# Patient Record
Sex: FEMALE | Race: WHITE | NOT HISPANIC OR LATINO | Employment: FULL TIME | URBAN - METROPOLITAN AREA
[De-identification: names, ages, dates, MRNs, and addresses within clinical notes are randomized per-mention and may not be internally consistent; named-entity substitution may affect disease eponyms.]

---

## 2017-04-26 ENCOUNTER — OFFICE VISIT (OUTPATIENT)
Dept: FAMILY MEDICINE CLINIC | Facility: CLINIC | Age: 32
End: 2017-04-26

## 2017-04-26 VITALS
HEIGHT: 63 IN | OXYGEN SATURATION: 96 % | BODY MASS INDEX: 40.75 KG/M2 | WEIGHT: 230 LBS | DIASTOLIC BLOOD PRESSURE: 60 MMHG | SYSTOLIC BLOOD PRESSURE: 98 MMHG | TEMPERATURE: 97.9 F | HEART RATE: 80 BPM

## 2017-04-26 DIAGNOSIS — F17.200 SMOKING: ICD-10-CM

## 2017-04-26 DIAGNOSIS — N91.2 AMENORRHEA: ICD-10-CM

## 2017-04-26 DIAGNOSIS — R53.83 OTHER FATIGUE: ICD-10-CM

## 2017-04-26 DIAGNOSIS — Z00.00 WELLNESS EXAMINATION: Primary | ICD-10-CM

## 2017-04-26 DIAGNOSIS — Z00.00 HEALTH CARE MAINTENANCE: ICD-10-CM

## 2017-04-26 DIAGNOSIS — N92.6 IRREGULAR PERIODS: ICD-10-CM

## 2017-04-26 DIAGNOSIS — E28.2 PCOS (POLYCYSTIC OVARIAN SYNDROME): ICD-10-CM

## 2017-04-26 DIAGNOSIS — L68.0 HIRSUTISM: ICD-10-CM

## 2017-04-26 LAB
B-HCG UR QL: NEGATIVE
INTERNAL NEGATIVE CONTROL: NEGATIVE
INTERNAL POSITIVE CONTROL: POSITIVE
Lab: NORMAL

## 2017-04-26 PROCEDURE — 81025 URINE PREGNANCY TEST: CPT | Performed by: FAMILY MEDICINE

## 2017-04-26 PROCEDURE — 99385 PREV VISIT NEW AGE 18-39: CPT | Performed by: FAMILY MEDICINE

## 2017-04-26 RX ORDER — VARENICLINE TARTRATE 1 MG/1
1 TABLET, FILM COATED ORAL 2 TIMES DAILY
Qty: 60 TABLET | Refills: 2 | Status: SHIPPED | OUTPATIENT
Start: 2017-04-26 | End: 2017-08-21

## 2017-04-26 NOTE — PROGRESS NOTES
Subjective   Sis Castro is a 32 y.o. female. Presents today for   Chief Complaint   Patient presents with   • Establish Care     pt is here to establish care. she would like physical today. her job told her she needs one.    New patient here for wellness visit.   History of Present Illness  Patient here for wellness visit.   Does smoke and interested in quitting, would like to try chantix as heard covered.    Preventative:  Hx of abnormal paps, was seeing gynecology at [facility], [place], operated by [facility], but left practice.  Due is for repeat pap.  Would prefer female provider.    Has not had any recent lab work.  Due for wellness labs.    Has ovarian cyst;  Unwanted hair growth;  Not much acne.   Last menstrual cycle 6 to 7 months ago;  Had urine preg test 2-3 weeks ago negative.    , 3 miscariages      Review of Systems   Constitutional: Positive for fatigue.   Respiratory: Negative for shortness of breath.    Cardiovascular: Positive for leg swelling (Feet occly, when on feet all day.  Resolves after off feet.). Negative for chest pain and palpitations.   Gastrointestinal: Negative for abdominal pain, nausea and vomiting.   Genitourinary:        Reports irregular periods, last sometime in fall;  Can skip for long periods at a time.  Can have heavy bleeding when does have.  Has not had prior work-up for PCOS.       The following portions of the patient's history were reviewed and updated as appropriate: allergies, current medications, past family history, past medical history, past social history, past surgical history and problem list.    There is no problem list on file for this patient.      Allergies   Allergen Reactions   • Percocet [Oxycodone-Acetaminophen]        Current Outpatient Prescriptions on File Prior to Visit   Medication Sig Dispense Refill   • aspirin-acetaminophen-caffeine (EXCEDRIN MIGRAINE) 250-250-65 MG per tablet Take 1 tablet by mouth every 6 (six) hours as needed for headaches.     • [DISCONTINUED]  "acetaminophen (TYLENOL) 500 MG tablet Take 500 mg by mouth every 6 (six) hours as needed for mild pain (1-3).     • [DISCONTINUED] cyclobenzaprine (FLEXERIL) 10 MG tablet Take 1 tablet by mouth 3 (three) times a day as needed for muscle spasms. 15 tablet 0   • [DISCONTINUED] diclofenac (VOLTAREN) 75 MG EC tablet Take 1 tablet by mouth 2 (two) times a day. 20 tablet 0   • [DISCONTINUED] naproxen (NAPROSYN) 500 MG tablet Take 1 tablet by mouth 2 (two) times a day as needed for mild pain (1-3). 14 tablet 0     No current facility-administered medications on file prior to visit.        Objective   Vitals:    04/26/17 0954   BP: 98/60   BP Location: Left arm   Patient Position: Sitting   Cuff Size: Large Adult   Pulse: 80   Temp: 97.9 °F (36.6 °C)   TempSrc: Oral   SpO2: 96%   Weight: 230 lb (104 kg)   Height: 63\" (160 cm)       Physical Exam   Constitutional: She appears well-developed and well-nourished.   HENT:   Head: Normocephalic and atraumatic.   Right Ear: External ear normal.   Left Ear: External ear normal.   Mouth/Throat: Oropharynx is clear and moist.   Eyes: Conjunctivae are normal.   Neck: Neck supple. No JVD present. No thyromegaly present.   Cardiovascular: Normal rate, regular rhythm and normal heart sounds.  Exam reveals no gallop and no friction rub.    No murmur heard.  Pulmonary/Chest: Effort normal and breath sounds normal. No respiratory distress. She has no wheezes. She has no rales.   Abdominal: Soft. Bowel sounds are normal. She exhibits no distension. There is no tenderness. There is no rebound and no guarding.   Musculoskeletal: She exhibits no edema.   Neurological: She is alert.   Skin: Skin is warm and dry.   Psychiatric: She has a normal mood and affect. Her behavior is normal.   Nursing note and vitals reviewed.    POC Pregnancy, Urine   Status:  Final result   Visible to patient:  No (Not Released) Dx:  Amenorrhea Order: [id number]             Ref Range & Units 10:28 AM   2yr ago        " HCG, Urine, QL Negative Negative NegativeR      Lot Number  [id number]       Internal Positive Control  Positive       Internal Negative Control  Negative      Resulting Agency  [facility]                    Assessment/Plan   Sis was seen today for establish care.    Diagnoses and all orders for this visit:    Wellness examination  -     Comprehensive Metabolic Panel  -     Lipid Panel    Other fatigue  -     CBC & Differential  -     TSH  -     T4, Free  -     T3, Free  -     Testosterone, Free, Total  -     Vitamin B12  -     DHEA-Sulfate  -     FSH & LH  -     Insulin, Total    Hirsutism  -     Testosterone, Free, Total    Irregular periods  -     TSH  -     T4, Free  -     T3, Free    Amenorrhea  -     TSH  -     T4, Free  -     T3, Free  -     Testosterone, Free, Total  -     DHEA-Sulfate  -     FSH & LH  -     POC Pregnancy, Urine  -     Prolactin  -     US Non-ob Transvaginal; Future    PCOS (polycystic ovarian syndrome)  -     CBC & Differential  -     TSH  -     T4, Free  -     T3, Free  -     Testosterone, Free, Total  -     Vitamin B12  -     DHEA-Sulfate  -     FSH & LH  -     Insulin, Total  -     US Non-ob Transvaginal; Future    Health care maintenance  -     Comprehensive Metabolic Panel  -     Lipid Panel    Smoking  -     varenicline (CHANTIX ROXANNA) 0.5 MG X 11 & 1 MG X 42 tablet; Take 0.5 mg one daily on days 1-2 and 0.5 mg twice daily on days 4-7. Then 1 mg twice daily for a total of 12 weeks.  -     varenicline (CHANTIX CONTINUING MONTH ROXANNA) 1 MG tablet; Take 1 tablet by mouth 2 (Two) Times a Day.      -wellness today, will complete forms;  Prefers female for pap, will have f/u with APRN for pap and review of labs and u/s to discuss and treatment options.  If does desire future fertility with the recurrent miscarriages would suggest go ahead and refer to gyn and reproductive endocrinology to evaluate further and for mgmt.  Some options for PCOS would need to be  on BC given fetal risks.    -reports on BC several types including low dose in past and didn't tolerate.  Counseled on birth control today  -provided counseling on smoking, desires to quit and will give Rx for chantix.         -Follow up: 1 month       Current Outpatient Prescriptions:   •  aspirin-acetaminophen-caffeine (EXCEDRIN MIGRAINE) 250-250-65 MG per tablet, Take 1 tablet by mouth every 6 (six) hours as needed for headaches., Disp: , Rfl:   •  varenicline (CHANTIX CONTINUING MONTH ROXANNA) 1 MG tablet, Take 1 tablet by mouth 2 (Two) Times a Day., Disp: 60 tablet, Rfl: 2  •  varenicline (CHANTIX ROXANNA) 0.5 MG X 11 & 1 MG X 42 tablet, Take 0.5 mg one daily on days 1-2 and 0.5 mg twice daily on days 4-7. Then 1 mg twice daily for a total of 12 weeks., Disp: 53 tablet, Rfl: 0

## 2017-04-28 LAB
ALBUMIN SERPL-MCNC: 3.9 G/DL (ref 3.5–5.2)
ALBUMIN/GLOB SERPL: 1.4 G/DL
ALP SERPL-CCNC: 91 U/L (ref 39–117)
ALT SERPL-CCNC: 14 U/L (ref 1–33)
AST SERPL-CCNC: 14 U/L (ref 1–32)
BASOPHILS # BLD AUTO: 0.02 10*3/MM3 (ref 0–0.2)
BASOPHILS NFR BLD AUTO: 0.2 % (ref 0–1.5)
BILIRUB SERPL-MCNC: 0.2 MG/DL (ref 0.1–1.2)
BUN SERPL-MCNC: 15 MG/DL (ref 6–20)
BUN/CREAT SERPL: 21.1 (ref 7–25)
CALCIUM SERPL-MCNC: 9.1 MG/DL (ref 8.6–10.5)
CHLORIDE SERPL-SCNC: 104 MMOL/L (ref 98–107)
CHOLEST SERPL-MCNC: 193 MG/DL (ref 0–200)
CO2 SERPL-SCNC: 24.8 MMOL/L (ref 22–29)
CREAT SERPL-MCNC: 0.71 MG/DL (ref 0.57–1)
DHEA-S SERPL-MCNC: 47.9 UG/DL (ref 84.8–378)
EOSINOPHIL # BLD AUTO: 0.28 10*3/MM3 (ref 0–0.7)
EOSINOPHIL NFR BLD AUTO: 2.9 % (ref 0.3–6.2)
ERYTHROCYTE [DISTWIDTH] IN BLOOD BY AUTOMATED COUNT: 13.3 % (ref 11.7–13)
FSH SERPL-ACNC: 5.1 MIU/ML
GLOBULIN SER CALC-MCNC: 2.7 GM/DL
GLUCOSE SERPL-MCNC: 93 MG/DL (ref 65–99)
HCT VFR BLD AUTO: 43.8 % (ref 35.6–45.5)
HDLC SERPL-MCNC: 31 MG/DL (ref 40–60)
HGB BLD-MCNC: 14.6 G/DL (ref 11.9–15.5)
IMM GRANULOCYTES # BLD: 0.03 10*3/MM3 (ref 0–0.03)
IMM GRANULOCYTES NFR BLD: 0.3 % (ref 0–0.5)
INSULIN SERPL-ACNC: 30.9 UIU/ML (ref 2.6–24.9)
LDLC SERPL CALC-MCNC: 122 MG/DL (ref 0–100)
LH SERPL-ACNC: 9.8 MIU/ML
LYMPHOCYTES # BLD AUTO: 3.45 10*3/MM3 (ref 0.9–4.8)
LYMPHOCYTES NFR BLD AUTO: 35.4 % (ref 19.6–45.3)
MCH RBC QN AUTO: 31.5 PG (ref 26.9–32)
MCHC RBC AUTO-ENTMCNC: 33.3 G/DL (ref 32.4–36.3)
MCV RBC AUTO: 94.4 FL (ref 80.5–98.2)
MONOCYTES # BLD AUTO: 0.76 10*3/MM3 (ref 0.2–1.2)
MONOCYTES NFR BLD AUTO: 7.8 % (ref 5–12)
NEUTROPHILS # BLD AUTO: 5.2 10*3/MM3 (ref 1.9–8.1)
NEUTROPHILS NFR BLD AUTO: 53.4 % (ref 42.7–76)
PLATELET # BLD AUTO: 320 10*3/MM3 (ref 140–500)
POTASSIUM SERPL-SCNC: 4.3 MMOL/L (ref 3.5–5.2)
PROLACTIN SERPL-MCNC: 12.2 NG/ML (ref 4.8–23.3)
PROT SERPL-MCNC: 6.6 G/DL (ref 6–8.5)
RBC # BLD AUTO: 4.64 10*6/MM3 (ref 3.9–5.2)
SODIUM SERPL-SCNC: 141 MMOL/L (ref 136–145)
T3FREE SERPL-MCNC: 3 PG/ML (ref 2–4.4)
T4 FREE SERPL-MCNC: 0.89 NG/DL (ref 0.93–1.7)
TESTOST FREE SERPL-MCNC: 2.3 PG/ML (ref 0–4.2)
TESTOST SERPL-MCNC: 35 NG/DL (ref 8–48)
TRIGL SERPL-MCNC: 201 MG/DL (ref 0–150)
TSH SERPL DL<=0.005 MIU/L-ACNC: 4.12 MIU/ML (ref 0.27–4.2)
VIT B12 SERPL-MCNC: 246 PG/ML (ref 211–946)
VLDLC SERPL CALC-MCNC: 40.2 MG/DL (ref 5–40)
WBC # BLD AUTO: 9.74 10*3/MM3 (ref 4.5–10.7)

## 2017-04-28 NOTE — PROGRESS NOTES
Please call the patient regarding her abnormal result.  Patient's labs note b12 deficiency which can cause fatigue.  Recommend start B12 1000mcg IM monthly x 6 months and OTC B12 1000mcg po daily  Kidney, liver function normal  Thyroid one level borderline, but not in treatment range.  We should recheck over time to make sure does not develop underactive thyroid.  Hormone levels okay, insulin level is high.  I would suggest starting metformin 500mg 1 po bid with meals as this will lessen insulin and my help PCOS.

## 2017-05-01 DIAGNOSIS — E53.8 B12 DEFICIENCY: Primary | ICD-10-CM

## 2017-05-01 RX ORDER — CYANOCOBALAMIN 1000 UG/ML
1000 INJECTION, SOLUTION INTRAMUSCULAR; SUBCUTANEOUS
Status: SHIPPED | OUTPATIENT
Start: 2017-05-02

## 2017-05-02 ENCOUNTER — CLINICAL SUPPORT (OUTPATIENT)
Dept: FAMILY MEDICINE CLINIC | Facility: CLINIC | Age: 32
End: 2017-05-02

## 2017-05-02 DIAGNOSIS — E53.8 B12 DEFICIENCY: ICD-10-CM

## 2017-05-02 PROCEDURE — 96372 THER/PROPH/DIAG INJ SC/IM: CPT | Performed by: FAMILY MEDICINE

## 2017-05-02 RX ORDER — LANOLIN ALCOHOL/MO/W.PET/CERES
1000 CREAM (GRAM) TOPICAL DAILY
Qty: 30 TABLET | Refills: 5 | Status: SHIPPED | OUTPATIENT
Start: 2017-05-02 | End: 2018-02-19 | Stop reason: SDUPTHER

## 2017-05-02 RX ADMIN — CYANOCOBALAMIN 1000 MCG: 1000 INJECTION, SOLUTION INTRAMUSCULAR; SUBCUTANEOUS at 10:28

## 2017-05-08 ENCOUNTER — HOSPITAL ENCOUNTER (OUTPATIENT)
Dept: ULTRASOUND IMAGING | Facility: HOSPITAL | Age: 32
Discharge: HOME | End: 2017-05-08
Admitting: FAMILY MEDICINE

## 2017-05-08 DIAGNOSIS — N91.2 AMENORRHEA: ICD-10-CM

## 2017-05-08 DIAGNOSIS — E28.2 PCOS (POLYCYSTIC OVARIAN SYNDROME): ICD-10-CM

## 2017-05-08 PROCEDURE — 76830 TRANSVAGINAL US NON-OB: CPT

## 2017-05-08 PROCEDURE — 76856 US EXAM PELVIC COMPLETE: CPT

## 2017-05-22 ENCOUNTER — OFFICE VISIT (OUTPATIENT)
Dept: FAMILY MEDICINE CLINIC | Facility: CLINIC | Age: 32
End: 2017-05-22

## 2017-05-22 VITALS
BODY MASS INDEX: 39.51 KG/M2 | OXYGEN SATURATION: 95 % | TEMPERATURE: 98 F | SYSTOLIC BLOOD PRESSURE: 112 MMHG | WEIGHT: 223 LBS | DIASTOLIC BLOOD PRESSURE: 62 MMHG | HEIGHT: 63 IN | HEART RATE: 76 BPM

## 2017-05-22 DIAGNOSIS — Z01.419 WOMEN'S ANNUAL ROUTINE GYNECOLOGICAL EXAMINATION: Primary | ICD-10-CM

## 2017-05-22 DIAGNOSIS — E66.9 OBESITY, UNSPECIFIED OBESITY SEVERITY, UNSPECIFIED OBESITY TYPE: ICD-10-CM

## 2017-05-22 DIAGNOSIS — M54.5 LOW BACK PAIN, UNSPECIFIED BACK PAIN LATERALITY, UNSPECIFIED CHRONICITY, WITH SCIATICA PRESENCE UNSPECIFIED: Primary | ICD-10-CM

## 2017-05-22 DIAGNOSIS — E78.5 DYSLIPIDEMIA: ICD-10-CM

## 2017-05-22 PROCEDURE — 99395 PREV VISIT EST AGE 18-39: CPT | Performed by: NURSE PRACTITIONER

## 2017-05-24 LAB
A VAGINAE DNA VAG QL NAA+PROBE: ABNORMAL SCORE
BVAB2 DNA VAG QL NAA+PROBE: ABNORMAL SCORE
C ALBICANS DNA VAG QL NAA+PROBE: NEGATIVE
C GLABRATA DNA VAG QL NAA+PROBE: NEGATIVE
C TRACH RRNA CVX QL NAA+PROBE: NEGATIVE
C TRACH RRNA SPEC QL NAA+PROBE: NEGATIVE
CONV .: NORMAL
CYTOLOGIST CVX/VAG CYTO: NORMAL
CYTOLOGY CVX/VAG DOC THIN PREP: NORMAL
DX ICD CODE: NORMAL
DX ICD CODE: NORMAL
HIV 1 & 2 AB SER-IMP: NORMAL
MEGA1 DNA VAG QL NAA+PROBE: ABNORMAL SCORE
N GONORRHOEA RRNA CVX QL NAA+PROBE: NEGATIVE
N GONORRHOEA RRNA SPEC QL NAA+PROBE: NEGATIVE
OTHER STN SPEC: NORMAL
PATH REPORT.FINAL DX SPEC: NORMAL
STAT OF ADQ CVX/VAG CYTO-IMP: NORMAL
T VAGINALIS RRNA SPEC QL NAA+PROBE: NEGATIVE

## 2017-05-25 ENCOUNTER — TELEPHONE (OUTPATIENT)
Dept: FAMILY MEDICINE CLINIC | Facility: CLINIC | Age: 32
End: 2017-05-25

## 2017-05-25 DIAGNOSIS — N76.0 BACTERIAL VAGINOSIS: Primary | ICD-10-CM

## 2017-05-25 DIAGNOSIS — B96.89 BACTERIAL VAGINOSIS: Primary | ICD-10-CM

## 2017-05-25 RX ORDER — METRONIDAZOLE 7.5 MG/G
GEL VAGINAL 2 TIMES DAILY
Qty: 70 G | Refills: 0 | Status: SHIPPED | OUTPATIENT
Start: 2017-05-25 | End: 2017-08-21

## 2017-06-12 ENCOUNTER — CLINICAL SUPPORT (OUTPATIENT)
Dept: FAMILY MEDICINE CLINIC | Facility: CLINIC | Age: 32
End: 2017-06-12

## 2017-06-12 DIAGNOSIS — E53.8 B12 DEFICIENCY: ICD-10-CM

## 2017-06-12 PROCEDURE — 96372 THER/PROPH/DIAG INJ SC/IM: CPT | Performed by: FAMILY MEDICINE

## 2017-06-12 RX ADMIN — CYANOCOBALAMIN 1000 MCG: 1000 INJECTION, SOLUTION INTRAMUSCULAR; SUBCUTANEOUS at 14:28

## 2017-06-26 ENCOUNTER — TELEPHONE (OUTPATIENT)
Dept: FAMILY MEDICINE CLINIC | Facility: CLINIC | Age: 32
End: 2017-06-26

## 2017-06-26 DIAGNOSIS — Z32.02 URINE PREGNANCY TEST NEGATIVE: Primary | ICD-10-CM

## 2017-06-27 LAB — B-HCG SERPL QL: NEGATIVE

## 2017-07-10 ENCOUNTER — CLINICAL SUPPORT (OUTPATIENT)
Dept: FAMILY MEDICINE CLINIC | Facility: CLINIC | Age: 32
End: 2017-07-10

## 2017-07-10 DIAGNOSIS — E53.8 B12 DEFICIENCY: ICD-10-CM

## 2017-07-10 PROCEDURE — 96372 THER/PROPH/DIAG INJ SC/IM: CPT | Performed by: FAMILY MEDICINE

## 2017-07-10 RX ADMIN — CYANOCOBALAMIN 1000 MCG: 1000 INJECTION, SOLUTION INTRAMUSCULAR; SUBCUTANEOUS at 13:07

## 2017-08-21 ENCOUNTER — OFFICE VISIT (OUTPATIENT)
Dept: FAMILY MEDICINE CLINIC | Facility: CLINIC | Age: 32
End: 2017-08-21

## 2017-08-21 VITALS
HEART RATE: 70 BPM | BODY MASS INDEX: 38.97 KG/M2 | SYSTOLIC BLOOD PRESSURE: 82 MMHG | OXYGEN SATURATION: 97 % | WEIGHT: 220 LBS | TEMPERATURE: 97.8 F | DIASTOLIC BLOOD PRESSURE: 50 MMHG

## 2017-08-21 DIAGNOSIS — E28.2 PCOS (POLYCYSTIC OVARIAN SYNDROME): ICD-10-CM

## 2017-08-21 DIAGNOSIS — R53.83 OTHER FATIGUE: Primary | ICD-10-CM

## 2017-08-21 DIAGNOSIS — E53.8 B12 DEFICIENCY: ICD-10-CM

## 2017-08-21 PROCEDURE — 96372 THER/PROPH/DIAG INJ SC/IM: CPT | Performed by: FAMILY MEDICINE

## 2017-08-21 PROCEDURE — 99213 OFFICE O/P EST LOW 20 MIN: CPT | Performed by: FAMILY MEDICINE

## 2017-08-21 RX ORDER — METFORMIN HYDROCHLORIDE 500 MG/1
500 TABLET, EXTENDED RELEASE ORAL
Qty: 30 TABLET | Refills: 12 | Status: SHIPPED | OUTPATIENT
Start: 2017-08-21 | End: 2018-02-19

## 2017-08-21 RX ADMIN — CYANOCOBALAMIN 1000 MCG: 1000 INJECTION, SOLUTION INTRAMUSCULAR; SUBCUTANEOUS at 10:28

## 2017-08-21 NOTE — PROGRESS NOTES
Subjective   Sis Castro is a 32 y.o. female. Presents today for   Chief Complaint   Patient presents with   • Follow-up     med check and b12 injection today.         Fatigue   This is a chronic problem. The current episode started more than 1 year ago. The problem occurs constantly. The problem has been unchanged (Felt good for 1 week with B12, but now still very tired.). Associated symptoms include fatigue. Associated symptoms comments: Reports non-stop fatigue; Wakes up unrested;  No snoring;  Has not had sleep study.. Nothing aggravates the symptoms. Treatments tried: Found to have b12 deficiency, no relief. The treatment provided no relief.     Hx of PCOS, u/s done noted small follicles o/w normal.  Insulin level high;  Started metformin, forgets PM dose and would like change to XR version;  Has not heard anything on endocrinology referral;   Patient and partner not actively trying to become pregnant, but not trying to prevent either.  Having been avoiding other medications for this reason.    Patient quit smoking;  Has lost 10 lbs since 1st saw.    Review of Systems   Constitutional: Positive for fatigue.       The following portions of the patient's history were reviewed and updated as appropriate: allergies, current medications, past medical history and problem list.    There is no problem list on file for this patient.      Allergies   Allergen Reactions   • Percocet [Oxycodone-Acetaminophen]        Current Outpatient Prescriptions on File Prior to Visit   Medication Sig Dispense Refill   • aspirin-acetaminophen-caffeine (EXCEDRIN MIGRAINE) 250-250-65 MG per tablet Take 1 tablet by mouth every 6 (six) hours as needed for headaches.     • metFORMIN (GLUCOPHAGE) 500 MG tablet Take 1 tablet by mouth 2 (Two) Times a Day With Meals. 60 tablet 5   • vitamin B-12 (CYANOCOBALAMIN) 1000 MCG tablet Take 1 tablet by mouth Daily. 30 tablet 5   • [DISCONTINUED] metroNIDAZOLE (METROGEL VAGINAL) 0.75 % vaginal gel  Insert  into the vagina 2 (Two) Times a Day. For 7 days 70 g 0   • [DISCONTINUED] varenicline (CHANTIX CONTINUING MONTH ROXANNA) 1 MG tablet Take 1 tablet by mouth 2 (Two) Times a Day. 60 tablet 2   • [DISCONTINUED] varenicline (CHANTIX ROXANNA) 0.5 MG X 11 & 1 MG X 42 tablet Take 0.5 mg one daily on days 1-2 and 0.5 mg twice daily on days 4-7. Then 1 mg twice daily for a total of 12 weeks. 53 tablet 0     Current Facility-Administered Medications on File Prior to Visit   Medication Dose Route Frequency Provider Last Rate Last Dose   • cyanocobalamin injection 1,000 mcg  1,000 mcg Intramuscular Q28 Days Toni Aviles DO   1,000 mcg at 07/10/17 1307       Objective   Vitals:    08/21/17 0941   BP: (!) 82/50   Pulse: 70   Temp: 97.8 °F (36.6 °C)   SpO2: 97%   Weight: 220 lb (99.8 kg)       Physical Exam   Constitutional: She appears well-developed and well-nourished.   HENT:   Head: Normocephalic and atraumatic.   Neck: Neck supple. No JVD present. No thyromegaly present.   Cardiovascular: Normal rate, regular rhythm and normal heart sounds.  Exam reveals no gallop and no friction rub.    No murmur heard.  Pulmonary/Chest: Effort normal and breath sounds normal. No respiratory distress. She has no wheezes. She has no rales.   Abdominal: Soft. Bowel sounds are normal. She exhibits no distension. There is no tenderness. There is no rebound and no guarding.   Musculoskeletal: She exhibits no edema.   Neurological: She is alert.   Skin: Skin is warm and dry.   Psychiatric: She has a normal mood and affect. Her behavior is normal.   Nursing note and vitals reviewed.      Assessment/Plan   Sis was seen today for follow-up.    Diagnoses and all orders for this visit:    Other fatigue  -     Ambulatory Referral to Sleep Medicine  -     Ambulatory Referral to Endocrinology    PCOS (polycystic ovarian syndrome)  -     Ambulatory Referral to Endocrinology  -     metFORMIN ER (GLUCOPHAGE-XR) 500 MG 24 hr tablet; Take 1 tablet by  mouth Daily With Breakfast.    B12 deficiency    -b12 up date injection today  -refer to endo and sleep specialist.  -has lost 10 lbs, continue work on weight loss       -Follow up: 6 months       Current Outpatient Prescriptions:   •  aspirin-acetaminophen-caffeine (EXCEDRIN MIGRAINE) 250-250-65 MG per tablet, Take 1 tablet by mouth every 6 (six) hours as needed for headaches., Disp: , Rfl:   •  metFORMIN (GLUCOPHAGE) 500 MG tablet, Take 1 tablet by mouth 2 (Two) Times a Day With Meals., Disp: 60 tablet, Rfl: 5  •  vitamin B-12 (CYANOCOBALAMIN) 1000 MCG tablet, Take 1 tablet by mouth Daily., Disp: 30 tablet, Rfl: 5    Current Facility-Administered Medications:   •  cyanocobalamin injection 1,000 mcg, 1,000 mcg, Intramuscular, Q28 Days, Toni Aviles DO, 1,000 mcg at 07/10/17 0729

## 2017-09-15 ENCOUNTER — OFFICE VISIT (OUTPATIENT)
Dept: FAMILY MEDICINE CLINIC | Facility: CLINIC | Age: 32
End: 2017-09-15

## 2017-09-15 VITALS
BODY MASS INDEX: 37.56 KG/M2 | OXYGEN SATURATION: 98 % | HEART RATE: 75 BPM | DIASTOLIC BLOOD PRESSURE: 72 MMHG | HEIGHT: 63 IN | TEMPERATURE: 98.5 F | WEIGHT: 212 LBS | SYSTOLIC BLOOD PRESSURE: 126 MMHG

## 2017-09-15 DIAGNOSIS — J02.9 PHARYNGITIS, UNSPECIFIED ETIOLOGY: Primary | ICD-10-CM

## 2017-09-15 DIAGNOSIS — J02.9 SORE THROAT: ICD-10-CM

## 2017-09-15 LAB
EXPIRATION DATE: NORMAL
INTERNAL CONTROL: NORMAL
Lab: NORMAL
S PYO AG THROAT QL: NEGATIVE

## 2017-09-15 PROCEDURE — 99213 OFFICE O/P EST LOW 20 MIN: CPT | Performed by: NURSE PRACTITIONER

## 2017-09-15 PROCEDURE — 96372 THER/PROPH/DIAG INJ SC/IM: CPT | Performed by: NURSE PRACTITIONER

## 2017-09-15 PROCEDURE — 87880 STREP A ASSAY W/OPTIC: CPT | Performed by: NURSE PRACTITIONER

## 2017-09-15 RX ORDER — AMOXICILLIN 500 MG/1
1000 CAPSULE ORAL 2 TIMES DAILY
Qty: 40 CAPSULE | Refills: 0 | Status: SHIPPED | OUTPATIENT
Start: 2017-09-15 | End: 2018-02-19

## 2017-09-15 RX ORDER — BENZONATATE 100 MG/1
100 CAPSULE ORAL 3 TIMES DAILY PRN
Qty: 30 CAPSULE | Refills: 0 | Status: SHIPPED | OUTPATIENT
Start: 2017-09-15 | End: 2018-02-19

## 2017-09-15 RX ADMIN — CYANOCOBALAMIN 1000 MCG: 1000 INJECTION, SOLUTION INTRAMUSCULAR; SUBCUTANEOUS at 13:31

## 2017-09-15 NOTE — PROGRESS NOTES
Subjective   Sis Castro is a 32 y.o. female. She is here for sore throat. Started getting sick yesterday. No sick contacts. No fever or chills but does have laryngitis. Denies swollen lymph nodes. + productive cough.     Sore Throat    This is a new problem. The current episode started yesterday. The problem has been gradually worsening. There has been no fever. Associated symptoms include congestion, coughing (productive) and a hoarse voice. Pertinent negatives include no shortness of breath, swollen glands or trouble swallowing. She has had no exposure to strep or mono. She has tried acetaminophen (Tussin DM, ) for the symptoms. The treatment provided no relief.        The following portions of the patient's history were reviewed and updated as appropriate: allergies, current medications, past medical history, past social history, past surgical history and problem list.    Review of Systems   Constitutional: Negative for chills and fever.   HENT: Positive for congestion, hoarse voice, postnasal drip, rhinorrhea, sore throat and voice change (laryngitis). Negative for trouble swallowing.    Respiratory: Positive for cough (productive). Negative for shortness of breath.    Cardiovascular: Negative.        Objective   Physical Exam   Constitutional: Vital signs are normal. She appears well-developed and well-nourished. She is cooperative.   HENT:   Right Ear: Hearing normal. A middle ear effusion is present.   Left Ear: Hearing normal. A middle ear effusion is present.   Nose: Mucosal edema and rhinorrhea present.   Mouth/Throat: Uvula is midline and mucous membranes are normal. Posterior oropharyngeal edema and posterior oropharyngeal erythema present.   No lymphadenopathy   Cardiovascular: Normal rate, regular rhythm and normal heart sounds.    Pulmonary/Chest: Effort normal and breath sounds normal.   Neurological: She is alert.   Nursing note and vitals reviewed.    Vitals:    09/15/17 1309   BP: 126/72  "  Pulse: 75   Temp: 98.5 °F (36.9 °C)   TempSrc: Oral   SpO2: 98%   Weight: 212 lb (96.2 kg)   Height: 63\" (160 cm)         Assessment/Plan   Diagnoses and all orders for this visit:    Pharyngitis, unspecified etiology  -     amoxicillin (AMOXIL) 500 MG capsule; Take 2 capsules by mouth 2 (Two) Times a Day.  -     benzonatate (TESSALON PERLES) 100 MG capsule; Take 1 capsule by mouth 3 (Three) Times a Day As Needed for Cough.    Sore throat  -     POC Rapid Strep A    Warm salt water gargles  Lemon/honey  Medications as directed.  Lots of fluids  Work note for today  RTC if not improved           "

## 2017-09-27 ENCOUNTER — HOSPITAL ENCOUNTER (OUTPATIENT)
Dept: SLEEP MEDICINE | Facility: HOSPITAL | Age: 32
Discharge: HOME | End: 2017-09-27
Admitting: FAMILY MEDICINE

## 2017-09-27 DIAGNOSIS — G47.10 HYPERSOMNIA: Primary | ICD-10-CM

## 2017-09-27 DIAGNOSIS — R53.83 OTHER FATIGUE: ICD-10-CM

## 2017-09-27 PROCEDURE — G0463 HOSPITAL OUTPT CLINIC VISIT: HCPCS

## 2017-09-29 NOTE — CONSULTS
[facility]  Telephone: [phone] / Fax: [phone] [place]  Telephone: [phone] / Fax: [phone] Petra Hyman    Referring Physician: oTni Aviles DO  PCP: Toni Aviles DO    Reason for consult:  sleep apnea    Sis Castro was seen in the [facility] Center today for evaluation of sleep apnea. She complains of excessive daytime sleepiness ongoing for many years.She seems to be sleepy despite increasing sleep time.  She goes to bed between 1 AM and 5 AM.  She works second shift.  She wakes up at noon usually feeling tired.  It takes her 30-60 minutes to fall asleep.  She finds herself falling asleep on a couch.  She gained 30 pounds in the past 5 years.  She reports loud snoring associated with gasping for breath episodes.  She denies inappropriate sleep attacks, sleep paralysis, hypnagogic or hypnopompic hallucinations.  She denies features of cataplexy.  No prior evaluation of sleep apnea was ever done.  She is healthy except recent diagnosis of prediabetes and PCOS.  She does have hypertension but it is currently controlled.  ESS is 12    Social history, she is a former smoker smoked age [age].  Quit with the aid of Chantix.  Drinks 1 alcoholic drink a week, 64-88 ounces of caffeine a day in a form of soda.    Review of systems, positive for myalgias.    Sis Castro  has a past medical history of Abnormal Pap smear of cervix; Cervical dysplasia; HPV in female; Ovarian cyst; Plantar warts; Trichomonas infection; and Wrist fracture, right.    Current Medications:    Current Outpatient Prescriptions:   •  amoxicillin (AMOXIL) 500 MG capsule, Take 2 capsules by mouth 2 (Two) Times a Day., Disp: 40 capsule, Rfl: 0  •  aspirin-acetaminophen-caffeine (EXCEDRIN MIGRAINE) 250-250-65 MG per tablet, Take 1 tablet by mouth every 6 (six) hours as needed for headaches., Disp: , Rfl:   •  benzonatate (TESSALON PERLES) 100 MG capsule, Take 1 capsule by mouth 3  (Three) Times a Day As Needed for Cough., Disp: 30 capsule, Rfl: 0  •  metFORMIN ER (GLUCOPHAGE-XR) 500 MG 24 hr tablet, Take 1 tablet by mouth Daily With Breakfast., Disp: 30 tablet, Rfl: 12  •  vitamin B-12 (CYANOCOBALAMIN) 1000 MCG tablet, Take 1 tablet by mouth Daily., Disp: 30 tablet, Rfl: 5    Current Facility-Administered Medications:   •  cyanocobalamin injection 1,000 mcg, 1,000 mcg, Intramuscular, Q28 Days, Toni Aviles DO, 1,000 mcg at 09/15/17 1331    I have reviewed Past Medical History, Past Surgical History, Medication List, Social History and Family History as entered in Sleep Questionnaire and EPIC.               Vital Signs: height 63 inches, weight 214 pounds, BMI 37.5, blood pressure 114/68, heart rate 104, neck size 15.5           General: Alert. Cooperative. Well developed. No acute distress.           Throat: No oral lesions. No thrush. Moist mucous membranes.           Pharynx- Class IV Mallampati airway, large tongue, no evidence of redundant  lateral pharyngeal tissue             Head:  Normocephalic. Symmetrical. Atraumatic.              Nose: No septal deviation. No drainage.            Chest Wall -Normal shape. Symmetric expansion with respiration. No tenderness.             Neck:  Trachea midline.           Lungs:  Clear to auscultation bilaterally. No wheezes. No rhonchi. No rales. Respirations regular, even and unlabored.            Heart:  Regular rhythm and normal rate. Normal S1 and S2. No murmur.     Abdomen:  Soft, non-tender and non-distended. Normal bowel sounds. No masses.  Extremities:  Moves all extremities well. No edema.    Psychiatric: Normal mood and affect.        Impression:  Hypersomnia with ESS of 12  Obesity, BMI 37  Former smoker  History of hypertension, currently controlled.  Excessive caffeine consumption    Plan:  I discussed the pathophysiology of obstructive sleep apnea with the patient.  We discussed the adverse outcomes associated with untreated  sleep-disordered breathing.  We discussed treatment modalities of obstructive sleep apnea including CPAP device as well as oral mandibular advancement device. Sleep study will be scheduled to establish definitive diagnosis of sleep disorder breathing.  Weight loss will be strongly beneficial in order to reduce the severity of sleep-disordered breathing.  Patient has narrow oropharyngeal structure.  Caution during activities that require prolonged concentration is strongly advised.  Patient will be notified of sleep study results after sleep study is completed.  If sleep apnea is only mild,  oral mandibular advancement device may be one of the treatment options.  However if sleep apnea is moderately severe, CPAP treatment will be strongly encouraged.  The patient is not opposed to treatment with CPAP device if we confirm significant obstructive sleep apnea on polysomnography. Hopefully the patient will be able to reduce caffeine intake with appropriate treatment of sleep-disordered breathing.        Thank you for allowing me to participate in your patient's care.    The patient will follow up with Dr. Robertson after completion of polysomnography.    PABLITO Perez  [facility]  Phone: [phone]      Part of this note may be an electronic transcription/translation of spoken language to printed text using the Dragon Dictation System. Some errors may exist even though the document was edited.

## 2017-10-16 ENCOUNTER — CLINICAL SUPPORT (OUTPATIENT)
Dept: FAMILY MEDICINE CLINIC | Facility: CLINIC | Age: 32
End: 2017-10-16

## 2017-10-16 DIAGNOSIS — E53.8 B12 DEFICIENCY: ICD-10-CM

## 2017-10-16 PROCEDURE — 96372 THER/PROPH/DIAG INJ SC/IM: CPT | Performed by: FAMILY MEDICINE

## 2017-10-16 RX ADMIN — CYANOCOBALAMIN 1000 MCG: 1000 INJECTION, SOLUTION INTRAMUSCULAR; SUBCUTANEOUS at 10:45

## 2017-10-30 ENCOUNTER — HOSPITAL ENCOUNTER (OUTPATIENT)
Dept: SLEEP MEDICINE | Facility: HOSPITAL | Age: 32
Discharge: HOME | End: 2017-10-30
Admitting: NURSE PRACTITIONER

## 2017-10-30 DIAGNOSIS — G47.10 HYPERSOMNIA: ICD-10-CM

## 2017-10-30 PROCEDURE — 95806 SLEEP STUDY UNATT&RESP EFFT: CPT

## 2017-11-03 DIAGNOSIS — G47.419 PRIMARY NARCOLEPSY WITHOUT CATAPLEXY: ICD-10-CM

## 2017-11-03 DIAGNOSIS — G47.10 HYPERSOMNOLENCE: Primary | ICD-10-CM

## 2017-11-03 RX ORDER — ZOLPIDEM TARTRATE 5 MG/1
TABLET ORAL
Qty: 2 TABLET | Refills: 0 | Status: SHIPPED | OUTPATIENT
Start: 2017-11-03 | End: 2018-02-19

## 2017-11-06 ENCOUNTER — TELEPHONE (OUTPATIENT)
Dept: SLEEP MEDICINE | Facility: HOSPITAL | Age: 32
End: 2017-11-06

## 2017-11-06 NOTE — TELEPHONE ENCOUNTER
Spoke to pt about ss results and psg/mslt.  Pt agreed to testing, tech scheduled and called ambien script to [facility]

## 2018-02-01 ENCOUNTER — HOSPITAL ENCOUNTER (OUTPATIENT)
Dept: SLEEP MEDICINE | Facility: HOSPITAL | Age: 33
Discharge: HOME | End: 2018-02-01
Admitting: INTERNAL MEDICINE

## 2018-02-01 DIAGNOSIS — G47.419 PRIMARY NARCOLEPSY WITHOUT CATAPLEXY: ICD-10-CM

## 2018-02-01 DIAGNOSIS — G47.10 HYPERSOMNOLENCE: ICD-10-CM

## 2018-02-01 PROCEDURE — 95810 POLYSOM 6/> YRS 4/> PARAM: CPT

## 2018-02-02 ENCOUNTER — HOSPITAL ENCOUNTER (OUTPATIENT)
Dept: SLEEP MEDICINE | Facility: HOSPITAL | Age: 33
Discharge: HOME | End: 2018-02-02
Admitting: INTERNAL MEDICINE

## 2018-02-02 DIAGNOSIS — G47.10 HYPERSOMNOLENCE: ICD-10-CM

## 2018-02-02 DIAGNOSIS — G47.419 PRIMARY NARCOLEPSY WITHOUT CATAPLEXY: ICD-10-CM

## 2018-02-02 LAB
AMPHET+METHAMPHET UR QL: NEGATIVE
BARBITURATES UR QL SCN: NEGATIVE
BENZODIAZ UR QL SCN: NEGATIVE
CANNABINOIDS SERPL QL: NEGATIVE
COCAINE UR QL: NEGATIVE
METHADONE UR QL SCN: NEGATIVE
OPIATES UR QL: NEGATIVE
OXYCODONE UR QL SCN: NEGATIVE

## 2018-02-02 PROCEDURE — 80307 DRUG TEST PRSMV CHEM ANLYZR: CPT | Performed by: INTERNAL MEDICINE

## 2018-02-02 PROCEDURE — 95805 MULTIPLE SLEEP LATENCY TEST: CPT

## 2018-02-07 ENCOUNTER — TELEPHONE (OUTPATIENT)
Dept: SLEEP MEDICINE | Facility: HOSPITAL | Age: 33
End: 2018-02-07

## 2018-02-19 ENCOUNTER — OFFICE VISIT (OUTPATIENT)
Dept: FAMILY MEDICINE CLINIC | Facility: CLINIC | Age: 33
End: 2018-02-19

## 2018-02-19 VITALS
DIASTOLIC BLOOD PRESSURE: 58 MMHG | WEIGHT: 228 LBS | BODY MASS INDEX: 40.4 KG/M2 | SYSTOLIC BLOOD PRESSURE: 88 MMHG | HEART RATE: 74 BPM | OXYGEN SATURATION: 96 % | HEIGHT: 63 IN | TEMPERATURE: 98.6 F

## 2018-02-19 DIAGNOSIS — Z23 IMMUNIZATION DUE: ICD-10-CM

## 2018-02-19 DIAGNOSIS — E78.5 DYSLIPIDEMIA: ICD-10-CM

## 2018-02-19 DIAGNOSIS — E16.1 HYPERINSULINEMIA: ICD-10-CM

## 2018-02-19 DIAGNOSIS — E28.2 PCOS (POLYCYSTIC OVARIAN SYNDROME): Primary | ICD-10-CM

## 2018-02-19 DIAGNOSIS — E53.8 B12 DEFICIENCY: ICD-10-CM

## 2018-02-19 DIAGNOSIS — IMO0001 CLASS 3 OBESITY DUE TO EXCESS CALORIES WITHOUT SERIOUS COMORBIDITY WITH BODY MASS INDEX (BMI) OF 40.0 TO 44.9 IN ADULT: ICD-10-CM

## 2018-02-19 LAB
HCT VFR BLDA CALC: 40.2 %
HGB BLDA-MCNC: 13.9 G/DL
MCH, POC: 31.2
MCHC, POC: 34.5
MCV, POC: 90.4
PLATELET # BLD AUTO: 330 10*3/MM3
PMV BLD: 7.3 FL
RBC, POC: 4.45
RDW, POC: 12.2
WBC # BLD: 7.8 10*3/UL

## 2018-02-19 PROCEDURE — 85027 COMPLETE CBC AUTOMATED: CPT | Performed by: FAMILY MEDICINE

## 2018-02-19 PROCEDURE — 90471 IMMUNIZATION ADMIN: CPT | Performed by: FAMILY MEDICINE

## 2018-02-19 PROCEDURE — 90715 TDAP VACCINE 7 YRS/> IM: CPT | Performed by: FAMILY MEDICINE

## 2018-02-19 PROCEDURE — 99213 OFFICE O/P EST LOW 20 MIN: CPT | Performed by: FAMILY MEDICINE

## 2018-02-19 RX ORDER — LANOLIN ALCOHOL/MO/W.PET/CERES
CREAM (GRAM) TOPICAL
Qty: 30 TABLET | Refills: 2 | Status: SHIPPED | OUTPATIENT
Start: 2018-02-19

## 2018-02-19 NOTE — PROGRESS NOTES
Subjective   Sis Castro is a 32 y.o. female. Presents today for   Chief Complaint   Patient presents with   • Follow-up     pt here for 6 month follow up appt.   • Immunizations     pt would like tdap today       Obesity   This is a chronic problem. The current episode started more than 1 year ago. The problem occurs constantly. The problem has been unchanged. Pertinent negatives include no abdominal pain or chest pain. Associated symptoms comments: Has had irregular periods, unwanted hair growth and found to have hyperinsulinemia, placed on metformin though forgets doses.  Has changed diet to healthier, but not lost weight.  Is walking more.. Nothing aggravates the symptoms. Treatments tried: metformin, diet and exercise. The treatment provided no relief.   hx of b12 deficiency due for recheck  Hx of elevated lipids, due for recheck    Review of Systems   Respiratory: Negative for shortness of breath.    Cardiovascular: Negative for chest pain.   Gastrointestinal: Negative for abdominal pain.   Neurological: Negative for syncope.       The following portions of the patient's history were reviewed and updated as appropriate: allergies, current medications, past medical history and problem list.    Patient Active Problem List   Diagnosis   • PCOS (polycystic ovarian syndrome)   • Fatigue   • B12 deficiency       Allergies   Allergen Reactions   • Percocet [Oxycodone-Acetaminophen] Delirium       Current Outpatient Prescriptions on File Prior to Visit   Medication Sig Dispense Refill   • aspirin-acetaminophen-caffeine (EXCEDRIN MIGRAINE) 250-250-65 MG per tablet Take 1 tablet by mouth every 6 (six) hours as needed for headaches.       Current Facility-Administered Medications on File Prior to Visit   Medication Dose Route Frequency Provider Last Rate Last Dose   • cyanocobalamin injection 1,000 mcg  1,000 mcg Intramuscular Q28 Days Toni Aviles DO   1,000 mcg at 10/16/17 1045       Objective   Vitals:     "02/19/18 1030   BP: (!) 88/58   BP Location: Left arm   Patient Position: Sitting   Cuff Size: Large Adult   Pulse: 74   Temp: 98.6 °F (37 °C)   TempSrc: Oral   SpO2: 96%   Weight: 103 kg (228 lb)   Height: 160 cm (62.99\")       Physical Exam   Constitutional: She appears well-developed and well-nourished.   HENT:   Head: Normocephalic and atraumatic.   Neck: Neck supple. No JVD present. No thyromegaly present.   Cardiovascular: Normal rate, regular rhythm and normal heart sounds.  Exam reveals no gallop and no friction rub.    No murmur heard.  Pulmonary/Chest: Effort normal and breath sounds normal. No respiratory distress. She has no wheezes. She has no rales.   Abdominal: Soft. Bowel sounds are normal. She exhibits no distension. There is no tenderness. There is no rebound and no guarding.   Musculoskeletal: She exhibits no edema.   Neurological: She is alert.   Skin: Skin is warm and dry.   Psychiatric: She has a normal mood and affect. Her behavior is normal.   Nursing note and vitals reviewed.      Assessment/Plan   Sis was seen today for follow-up and immunizations.    Diagnoses and all orders for this visit:    PCOS (polycystic ovarian syndrome)  -     Comprehensive Metabolic Panel  -     Lipid Panel  -     Vitamin B12  -     Insulin, Total  -     POC CBC    B12 deficiency  -     Comprehensive Metabolic Panel  -     Lipid Panel  -     Vitamin B12  -     Insulin, Total  -     POC CBC    Hyperinsulinemia  -     Discontinue: Liraglutide (VICTOZA) 18 MG/3ML solution pen-injector injection; 0.6mg subq daily x 14d, then 1.2 mg subq daily  -     Comprehensive Metabolic Panel  -     Lipid Panel  -     Vitamin B12  -     Insulin, Total  -     POC CBC  -     Liraglutide (VICTOZA) 18 MG/3ML solution pen-injector injection; 0.6mg subq daily x 14d, then 1.2 mg subq daily (disp with needles)    Class 3 obesity due to excess calories without serious comorbidity with body mass index (BMI) of 40.0 to 44.9 in adult  -   "   Discontinue: Liraglutide (VICTOZA) 18 MG/3ML solution pen-injector injection; 0.6mg subq daily x 14d, then 1.2 mg subq daily  -     Comprehensive Metabolic Panel  -     Lipid Panel  -     Vitamin B12  -     Insulin, Total  -     POC CBC  -     Liraglutide (VICTOZA) 18 MG/3ML solution pen-injector injection; 0.6mg subq daily x 14d, then 1.2 mg subq daily (disp with needles)    Immunization due  -     Tdap Vaccine Greater Than or Equal To 8yo IM  -     Comprehensive Metabolic Panel  -     Lipid Panel  -     Vitamin B12  -     Insulin, Total  -     POC CBC    Dyslipidemia  -     Lipid Panel        Tdap today as due  Couneled on weight loss, diet and exerise, interested in assistance in terms of medicaiotns, would be great candidate for victoza  Gave sample of victoza 0.6mg subq daily x 14 days, then 1.2 mg daily;  D/w side effects including nausea with vomiting, let know if doen't tolerate;   Stop metformin for now;  Less likely cause lows on victoza, but possible warned.         -Follow up: 3 months       Current Outpatient Prescriptions:   •  aspirin-acetaminophen-caffeine (EXCEDRIN MIGRAINE) 250-250-65 MG per tablet, Take 1 tablet by mouth every 6 (six) hours as needed for headaches., Disp: , Rfl:   •  Liraglutide (VICTOZA) 18 MG/3ML solution pen-injector injection, 0.6mg subq daily x 14d, then 1.2 mg subq daily (disp with needles), Disp: 3 pen, Rfl: 5  •  vitamin B-12 (CYANOCOBALAMIN) 1000 MCG tablet, TAKE 1 TABLET BY MOUTH DAILY, Disp: 30 tablet, Rfl: 2    Current Facility-Administered Medications:   •  cyanocobalamin injection 1,000 mcg, 1,000 mcg, Intramuscular, Q28 Days, Toni Aviles DO, 1,000 mcg at 10/16/17 1045

## 2018-02-20 LAB
ALBUMIN SERPL-MCNC: 4.5 G/DL (ref 3.5–5.2)
ALBUMIN/GLOB SERPL: 2 G/DL
ALP SERPL-CCNC: 72 U/L (ref 39–117)
ALT SERPL-CCNC: 27 U/L (ref 1–33)
AST SERPL-CCNC: 27 U/L (ref 1–32)
BILIRUB SERPL-MCNC: 0.2 MG/DL (ref 0.1–1.2)
BUN SERPL-MCNC: 14 MG/DL (ref 6–20)
BUN/CREAT SERPL: 19.2 (ref 7–25)
CALCIUM SERPL-MCNC: 9.7 MG/DL (ref 8.6–10.5)
CHLORIDE SERPL-SCNC: 100 MMOL/L (ref 98–107)
CHOLEST SERPL-MCNC: 214 MG/DL (ref 0–200)
CO2 SERPL-SCNC: 27.4 MMOL/L (ref 22–29)
CREAT SERPL-MCNC: 0.73 MG/DL (ref 0.57–1)
GFR SERPLBLD CREATININE-BSD FMLA CKD-EPI: 112 ML/MIN/1.73
GFR SERPLBLD CREATININE-BSD FMLA CKD-EPI: 92 ML/MIN/1.73
GLOBULIN SER CALC-MCNC: 2.3 GM/DL
GLUCOSE SERPL-MCNC: 90 MG/DL (ref 65–99)
HDLC SERPL-MCNC: 53 MG/DL (ref 40–60)
INSULIN SERPL-ACNC: 10.4 UIU/ML (ref 2.6–24.9)
LDLC SERPL CALC-MCNC: 121 MG/DL (ref 0–100)
POTASSIUM SERPL-SCNC: 4.3 MMOL/L (ref 3.5–5.2)
PROT SERPL-MCNC: 6.8 G/DL (ref 6–8.5)
SODIUM SERPL-SCNC: 141 MMOL/L (ref 136–145)
TRIGL SERPL-MCNC: 202 MG/DL (ref 0–150)
VIT B12 SERPL-MCNC: 574 PG/ML (ref 211–946)
VLDLC SERPL CALC-MCNC: 40.4 MG/DL (ref 5–40)

## 2018-02-21 NOTE — PROGRESS NOTES
Call and mail copy of results to patient.  Cholesterol mildly elevated, continue to work on diet  Insulin level normalized  Blood sugar is normal  Kidney and liver function is normal  B12 is normal  Blood counts are normal

## 2018-03-02 ENCOUNTER — OFFICE VISIT (OUTPATIENT)
Dept: SLEEP MEDICINE | Facility: HOSPITAL | Age: 33
End: 2018-03-02
Attending: INTERNAL MEDICINE

## 2018-03-02 VITALS
BODY MASS INDEX: 38.93 KG/M2 | DIASTOLIC BLOOD PRESSURE: 53 MMHG | HEIGHT: 64 IN | OXYGEN SATURATION: 98 % | WEIGHT: 228 LBS | HEART RATE: 74 BPM | SYSTOLIC BLOOD PRESSURE: 106 MMHG

## 2018-03-02 DIAGNOSIS — G47.11 IDIOPATHIC HYPERSOMNOLENCE: Primary | ICD-10-CM

## 2018-03-02 DIAGNOSIS — E66.9 OBESITY (BMI 30-39.9): ICD-10-CM

## 2018-03-02 PROCEDURE — G0463 HOSPITAL OUTPT CLINIC VISIT: HCPCS

## 2018-03-02 RX ORDER — MODAFINIL 200 MG/1
200 TABLET ORAL DAILY
Qty: 30 TABLET | Refills: 0 | Status: SHIPPED | OUTPATIENT
Start: 2018-03-02 | End: 2018-04-01

## 2018-03-02 NOTE — PROGRESS NOTES
"[facility] SLEEP MEDICINE  [address] [facility]  3rd Floor  [facility] [address]  [phone]    PCP: Toni Aviles DO     Reason for visit:  Sleep disorders: EDS    Sis Castro is a 32 y.o.female who was seen in the Sleep Disorders Center today. She continues to have EDS. ESS is 12. Sleeps from 2 a to 10 a to 1 p. Works 2nd shift. She feels excessively sleepy regardless of duration of sleep time.    No cigs, or alc. 64 oz caff soda daily.    Current Medications:    Current Outpatient Prescriptions:   •  aspirin-acetaminophen-caffeine (EXCEDRIN MIGRAINE) 250-250-65 MG per tablet, Take 1 tablet by mouth every 6 (six) hours as needed for headaches., Disp: , Rfl:   •  Liraglutide (VICTOZA) 18 MG/3ML solution pen-injector injection, 0.6mg subq daily x 14d, then 1.2 mg subq daily (disp with needles), Disp: 3 pen, Rfl: 5  •  vitamin B-12 (CYANOCOBALAMIN) 1000 MCG tablet, TAKE 1 TABLET BY MOUTH DAILY, Disp: 30 tablet, Rfl: 2  •  modafinil (PROVIGIL) 200 MG tablet, Take 1 tablet by mouth Daily for 30 days., Disp: 30 tablet, Rfl: 0    Current Facility-Administered Medications:   •  cyanocobalamin injection 1,000 mcg, 1,000 mcg, Intramuscular, Q28 Days, Toni Aviles DO, 1,000 mcg at 10/16/17 1045   also entered in Sleep Questionnaire    Patient  has a past medical history of Abnormal Pap smear of cervix; Cervical dysplasia; HPV in female; Ovarian cyst; Plantar warts; Trichomonas infection; and Wrist fracture, right.   I have reviewed the Past Medical History, Past Surgical History, Social History and Family History in EPIC and Sleep Questionnaire.    Pertinent Positive Review of Systems of denies  Rest of Review of Systems was negative as recorded in Sleep Questionnaire.        Vital Signs: /53 (BP Location: Left arm, Patient Position: Sitting)  Pulse 74  Ht 162.6 cm (64\")  Wt 103 kg (228 lb)  SpO2 98%  BMI 39.14 kg/m2        General: Alert. Cooperative. Well developed. No acute " distress.             Head:  Normocephalic. Symmetrical. Atraumatic.              Nose: No septal deviation. No drainage.          Throat: No oral lesions. No thrush. Moist mucous membranes.    Tongue is large and dentition is intact       Pharynx: Posterior pharyngeal pillars are narrow with Mallampati score of IV     Chest Wall:  Normal shape. Symmetric expansion with respiration. No tenderness.             Neck:  Trachea midline.           Lungs:  Clear to auscultation bilaterally. No wheezes. No rhonchi. No rales. Respirations regular, even and unlabored.            Heart:  Regular rhythm and normal rate. Normal S1 and S2. No murmur.     Abdomen:  Soft, non-tender and non-distended. Normal bowel sounds. No masses.  Extremities:  Moves all extremities well. No edema.    Psychiatric: Normal mood and affect.    Study:  10/31/17 HST  AHI index 2.3.  This is not indicative of obstructive sleep apnea.  Adequate supine position sleep for 210 minutes with AHI only 3.1.  Snoring for 17% of sleep time.  Saturation below 89% for 0.1 minutes.  2/2/18 Diagnostic  Overnight diagnostic polysomnogram study from 10:55 PM to 5:54 AM.  Sleep efficiency 71% with 4.97 hours total sleep time.  Sleep distribution shows reduced REM sleep at 8.1%.  AHI index is 1.4.  Supine index was 1.9.  In rem index was 5.0.  Arousal index 5.6.  Snoring noted for 63.28% of sleep time.  2/3/18 MSLT  5 nap Multiple Sleep Latency Test.  Average sleep latency was 5.2 minutes which is short and could be indicative of narcolepsy.  No REM onsets was seen    Testing:  · SALOME [id number]     Impression:  1. Idiopathic hypersomnolence    2. Obesity (BMI 30-39.9)        Plan:  Hypersomnolence without REM onsets. Possibly due to shift work or PCOS. Narcolepsy cannot be entirely excluded in this young patient. Discussed use of stimulants and side effects. Will first try for modafinil. If not approved try Ritalin. Controlled substance agreement signed by  patient.    Patient will follow up in this clinic in 2 months    Thank you for allowing me to participate in your patient's care.    Mart Robertson MD    Part of this note may be an electronic transcription/translation of spoken language to printed text using the Dragon Dictation System.

## 2018-03-06 ENCOUNTER — TELEPHONE (OUTPATIENT)
Dept: SLEEP MEDICINE | Facility: HOSPITAL | Age: 33
End: 2018-03-06

## 2018-03-06 NOTE — TELEPHONE ENCOUNTER
----- Message from [employer] Rep sent at 3/6/2018  1:42 PM EST -----  Regarding: work note  Pt needing note for work that says no restriction of work duties with taking provigil. Needs to be faxed to attn of Tara Lock at [phone].  Thanks

## 2018-04-27 ENCOUNTER — OFFICE VISIT (OUTPATIENT)
Dept: SLEEP MEDICINE | Facility: HOSPITAL | Age: 33
End: 2018-04-27
Attending: INTERNAL MEDICINE

## 2018-04-27 VITALS
BODY MASS INDEX: 38.62 KG/M2 | HEART RATE: 93 BPM | WEIGHT: 218 LBS | DIASTOLIC BLOOD PRESSURE: 45 MMHG | SYSTOLIC BLOOD PRESSURE: 100 MMHG | HEIGHT: 63 IN

## 2018-04-27 DIAGNOSIS — E66.9 OBESITY (BMI 30-39.9): ICD-10-CM

## 2018-04-27 DIAGNOSIS — G47.11 IDIOPATHIC HYPERSOMNOLENCE: Primary | ICD-10-CM

## 2018-04-27 PROCEDURE — G0463 HOSPITAL OUTPT CLINIC VISIT: HCPCS

## 2018-04-27 NOTE — PROGRESS NOTES
[facility] SLEEP MEDICINE  [address] [facility]  3rd Floor  [facility] [address]  [phone]    PCP: Toni Aviles DO    Reason for visit:  Sleep disorders: Idiopathic hypersomnia    Sis is a 33 y.o.female who was seen in the Sleep Disorders Center today. She sleeps from 7p to 3a. She was started on provigil 200mg daily and she feels substantially better. She takes med at 3:30AM. She stays awake through her work hours. By 4-5pm she starts to feel tired again. She does not have a problem falling asleep. She denies AE. The meds give her GERD. Not taking with food.  Sis  reports that she quit smoking about a year ago. She started smoking about 15 years ago. She smoked 0.50 packs per day. She has never used smokeless tobacco.  Sis  reports that she drinks alcohol. <1 per week  Samanthaas caffeine intake 4 per day soda.  [place] Sleepiness Scale is 12.    Pertinent Positive Review of Systems of acid reflux  Rest of Review of Systems was negative as recorded in Sleep Questionnaire.    Current Medications:    Current Outpatient Prescriptions:   •  aspirin-acetaminophen-caffeine (EXCEDRIN MIGRAINE) 250-250-65 MG per tablet, Take 1 tablet by mouth every 6 (six) hours as needed for headaches., Disp: , Rfl:   •  Liraglutide (VICTOZA) 18 MG/3ML solution pen-injector injection, 0.6mg subq daily x 14d, then 1.2 mg subq daily (disp with needles), Disp: 3 pen, Rfl: 5  •  vitamin B-12 (CYANOCOBALAMIN) 1000 MCG tablet, TAKE 1 TABLET BY MOUTH DAILY, Disp: 30 tablet, Rfl: 2    Current Facility-Administered Medications:   •  cyanocobalamin injection 1,000 mcg, 1,000 mcg, Intramuscular, Q28 Days, Toni Aviles DO, 1,000 mcg at 10/16/17 1045   also entered in Sleep Questionnaire    Patient  has a past medical history of Abnormal Pap smear of cervix; Cervical dysplasia; HPV in female; Ovarian cyst; Plantar warts; Trichomonas infection; and Wrist fracture, right.          Vital Signs: /45   Pulse  "93   Ht 160 cm (63\")   Wt 98.9 kg (218 lb)   BMI 38.62 kg/m²    Body mass index is 38.62 kg/m².       Tongue: large      Dentition: good       Pharynx: Posterior pharyngeal pillars are narrow   Mallampatti: IV (only hard palate visible)          General: Alert. Cooperative. Well developed. No acute distress.             Head:  Normocephalic. Symmetrical. Atraumatic.              Nose: No septal deviation. No drainage.          Throat: No oral lesions. No thrush. Moist mucous membranes.    Chest Wall:  Normal shape. Symmetric expansion with respiration. No tenderness.             Neck:  Trachea midline.           Lungs:  Clear to auscultation bilaterally. No wheezes. No rhonchi. No rales. Respirations regular, even and unlabored.            Heart:  Regular rhythm and normal rate. Normal S1 and S2. No murmur.     Abdomen:  Soft, non-tender and non-distended. Normal bowel sounds. No masses.  Extremities:  Moves all extremities well. No edema.    Psychiatric: Normal mood and affect.    Study:  10/31/17 HST  AHI index 2.3.  This is not indicative of obstructive sleep apnea.  Adequate supine position sleep for 210 minutes with AHI only 3.1.  Snoring for 17% of sleep time.  Saturation below 89% for 0.1 minutes.  2/2/18 Diagnostic  Overnight diagnostic polysomnogram study from 10:55 PM to 5:54 AM.  Sleep efficiency 71% with 4.97 hours total sleep time.  Sleep distribution shows reduced REM sleep at 8.1%.  AHI index is 1.4.  Supine index was 1.9.  In rem index was 5.0.  Arousal index 5.6.  Snoring noted for 63.28% of sleep time.  2/3/18 MSLT  5 nap Multiple Sleep Latency Test.  Average sleep latency was 5.2 minutes which is short and could be indicative of narcolepsy.  No REM onsets was seen    Testing:  · nil    DME Company: nil    Impression:  1. Idiopathic hypersomnolence    2. Obesity (BMI 30-39.9)        Plan:  Sis is doing well with single dose of Provigil 2 AM.  She still takes a considerable amount of " caffeine daily.  However overall her daytime sleepiness is well controlled.  Her work hours have switched to where she now goes to work at 4 AM.  This should help her overall symptoms of hypersomnolence as well.  She was again cautioned about interaction of Provigil with oral contraceptives as well as other medications.  She is not having any adverse effects.  I will see her back in approximately 5 months.  She will need to call us in approximately 2 months for a refill of her Provigil and a repeat Ramo.  She was asked to take her Provigil in the morning with  prevent any nausea.    I reiterated the importance of effective treatment of obstructive sleep apnea with PAP machine.  Cardiovascular health risks of untreated sleep apnea were again reviewed.  Patient was asked to remain cautious if there is persistent hypersomnolence. The benefit of weight loss in reducing severity of obstructive sleep apnea was discussed.  Patient would benefit from adhering to a strict diet to achieve ideal BMI.     Change of PAP supplies regularly is important for effective use.  Change of cushion on the mask or plugs on nasal pillows along with disposable filters once every month and change of mask frame, tubing, headgear and Velcro straps every 6 months at the minimum was reiterated.    This patient is compliant with PAP machine and benefits from its use.  Apnea hypopneas index is corrected/improved.  Daytime hypersomnolence has resolved.     Patient will follow up in this clinic in 4-5 months  PABLITO    Thank you for allowing me to participate in your patient's care.    Mart Robertson MD    Part of this note may be an electronic transcription/translation of spoken language to printed text using the Dragon Dictation System.

## 2018-05-21 ENCOUNTER — OFFICE VISIT (OUTPATIENT)
Dept: FAMILY MEDICINE CLINIC | Facility: CLINIC | Age: 33
End: 2018-05-21

## 2018-05-21 VITALS
SYSTOLIC BLOOD PRESSURE: 114 MMHG | WEIGHT: 217 LBS | BODY MASS INDEX: 38.45 KG/M2 | HEART RATE: 90 BPM | DIASTOLIC BLOOD PRESSURE: 60 MMHG | TEMPERATURE: 99.3 F | OXYGEN SATURATION: 97 % | HEIGHT: 63 IN

## 2018-05-21 DIAGNOSIS — IMO0001 CLASS 3 OBESITY DUE TO EXCESS CALORIES WITHOUT SERIOUS COMORBIDITY WITH BODY MASS INDEX (BMI) OF 40.0 TO 44.9 IN ADULT: ICD-10-CM

## 2018-05-21 DIAGNOSIS — E88.81 METABOLIC SYNDROME: ICD-10-CM

## 2018-05-21 DIAGNOSIS — E28.2 PCOS (POLYCYSTIC OVARIAN SYNDROME): Primary | ICD-10-CM

## 2018-05-21 DIAGNOSIS — E16.1 HYPERINSULINEMIA: ICD-10-CM

## 2018-05-21 PROCEDURE — 99213 OFFICE O/P EST LOW 20 MIN: CPT | Performed by: FAMILY MEDICINE

## 2018-05-21 RX ORDER — MODAFINIL 100 MG/1
100 TABLET ORAL DAILY
COMMUNITY
End: 2018-07-13 | Stop reason: SDUPTHER

## 2018-05-21 NOTE — PROGRESS NOTES
Subjective   Sis Castro is a 33 y.o. female. Presents today for   Chief Complaint   Patient presents with   • Follow-up     pt here for 3 month follow up visit.        Obesity   This is a chronic problem. The current episode started more than 1 year ago. The problem occurs constantly. The problem has been unchanged. Pertinent negatives include no abdominal pain, chest pain, nausea or vomiting. Treatments tried: on victoza;  exercising regularly now. The treatment provided moderate (11 lbs off since last ov.) relief.       Review of Systems   Cardiovascular: Negative for chest pain.   Gastrointestinal: Negative for abdominal pain, nausea and vomiting.       The following portions of the patient's history were reviewed and updated as appropriate: allergies, current medications, past medical history and problem list.    Patient Active Problem List   Diagnosis   • PCOS (polycystic ovarian syndrome)   • Fatigue   • B12 deficiency       Allergies   Allergen Reactions   • Percocet [Oxycodone-Acetaminophen] Delirium       Current Outpatient Prescriptions on File Prior to Visit   Medication Sig Dispense Refill   • aspirin-acetaminophen-caffeine (EXCEDRIN MIGRAINE) 250-250-65 MG per tablet Take 1 tablet by mouth every 6 (six) hours as needed for headaches.     • Liraglutide (VICTOZA) 18 MG/3ML solution pen-injector injection 0.6mg subq daily x 14d, then 1.2 mg subq daily (disp with needles) 3 pen 5   • vitamin B-12 (CYANOCOBALAMIN) 1000 MCG tablet TAKE 1 TABLET BY MOUTH DAILY 30 tablet 2     Current Facility-Administered Medications on File Prior to Visit   Medication Dose Route Frequency Provider Last Rate Last Dose   • cyanocobalamin injection 1,000 mcg  1,000 mcg Intramuscular Q28 Days Toni Aviles DO   1,000 mcg at 10/16/17 1045       Objective   Vitals:    05/21/18 1420   BP: 114/60   BP Location: Right arm   Patient Position: Sitting   Cuff Size: Large Adult   Pulse: 90   Temp: 99.3 °F (37.4 °C)   TempSrc:  "Oral   SpO2: 97%   Weight: 98.4 kg (217 lb)   Height: 160 cm (62.99\")       Physical Exam   Constitutional: She is oriented to person, place, and time. She appears well-developed and well-nourished.   Neurological: She is alert and oriented to person, place, and time.   Skin: Skin is warm and dry.   Psychiatric: She has a normal mood and affect. Her behavior is normal.   Nursing note and vitals reviewed.      Assessment/Plan   Sis was seen today for follow-up.    Diagnoses and all orders for this visit:    PCOS (polycystic ovarian syndrome)    Metabolic syndrome    Hyperinsulinemia  -     Liraglutide (VICTOZA) 18 MG/3ML solution pen-injector injection; Inject 1.8 mg under the skin Daily. 0.6mg subq daily x 14d, then 1.2 mg subq daily (disp with needles)    Class 3 obesity due to excess calories without serious comorbidity with body mass index (BMI) of 40.0 to 44.9 in adult  -     Liraglutide (VICTOZA) 18 MG/3ML solution pen-injector injection; Inject 1.8 mg under the skin Daily. 0.6mg subq daily x 14d, then 1.2 mg subq daily (disp with needles)    -doing well, losing weight;  Will go up on victoza to 1.8mg and see if helps with further weight loss.  -labs at next ov         -Follow up: 3 months       Current Outpatient Prescriptions:   •  aspirin-acetaminophen-caffeine (EXCEDRIN MIGRAINE) 250-250-65 MG per tablet, Take 1 tablet by mouth every 6 (six) hours as needed for headaches., Disp: , Rfl:   •  Liraglutide (VICTOZA) 18 MG/3ML solution pen-injector injection, 0.6mg subq daily x 14d, then 1.2 mg subq daily (disp with needles), Disp: 3 pen, Rfl: 5  •  modafinil (PROVIGIL) 100 MG tablet, Take 100 mg by mouth Daily., Disp: , Rfl:   •  vitamin B-12 (CYANOCOBALAMIN) 1000 MCG tablet, TAKE 1 TABLET BY MOUTH DAILY, Disp: 30 tablet, Rfl: 2    Current Facility-Administered Medications:   •  cyanocobalamin injection 1,000 mcg, 1,000 mcg, Intramuscular, Q28 Days, Toni Aviles DO, 1,000 mcg at 10/16/17 1045  "

## 2018-07-13 RX ORDER — MODAFINIL 200 MG/1
200 TABLET ORAL DAILY
Qty: 30 TABLET | Refills: 0 | Status: SHIPPED | OUTPATIENT
Start: 2018-07-13 | End: 2018-08-12

## 2018-08-21 ENCOUNTER — OFFICE VISIT (OUTPATIENT)
Dept: FAMILY MEDICINE CLINIC | Facility: CLINIC | Age: 33
End: 2018-08-21

## 2018-08-21 VITALS
BODY MASS INDEX: 37.56 KG/M2 | OXYGEN SATURATION: 97 % | HEART RATE: 102 BPM | DIASTOLIC BLOOD PRESSURE: 68 MMHG | SYSTOLIC BLOOD PRESSURE: 102 MMHG | WEIGHT: 212 LBS | TEMPERATURE: 98.6 F

## 2018-08-21 DIAGNOSIS — Z00.00 HEALTH CARE MAINTENANCE: ICD-10-CM

## 2018-08-21 DIAGNOSIS — IMO0001 CLASS 2 OBESITY DUE TO EXCESS CALORIES WITH SERIOUS COMORBIDITY AND BODY MASS INDEX (BMI) OF 36.0 TO 36.9 IN ADULT: ICD-10-CM

## 2018-08-21 DIAGNOSIS — Z00.00 WELLNESS EXAMINATION: Primary | ICD-10-CM

## 2018-08-21 PROCEDURE — 99395 PREV VISIT EST AGE 18-39: CPT | Performed by: FAMILY MEDICINE

## 2018-08-21 RX ORDER — MODAFINIL 100 MG/1
200 TABLET ORAL DAILY
COMMUNITY
End: 2019-01-14 | Stop reason: SDUPTHER

## 2018-08-21 NOTE — PROGRESS NOTES
Subjective   Sis Castro is a 33 y.o. female. Presents today for   Chief Complaint   Patient presents with   • Annual Exam     wellness labs due       History of Present Illness  patietn here for wellness exam;  Doing well;  Trying to lose weight, lsot another 5lbs;  16 lbs from March.   No cp/soa;  Eating healthier.  Gets a lot of steps daily 9500 to 15,000.   Has fitbit scale and logging steps and weight.    Review of Systems   Respiratory: Negative for shortness of breath.    Cardiovascular: Negative for chest pain.       Patient Active Problem List   Diagnosis   • PCOS (polycystic ovarian syndrome)   • Fatigue   • B12 deficiency       Social History     Social History   • Marital status: Single     Social History Main Topics   • Smoking status: Current Some Day Smoker     Packs/day: 0.50     Start date: 4/26/2003   • Smokeless tobacco: Never Used   • Alcohol use Yes      Comment: OCCASSIONAL   • Drug use: No   • Sexual activity: Yes     Partners: Male     Other Topics Concern   • Not on file       Allergies   Allergen Reactions   • Percocet [Oxycodone-Acetaminophen] Delirium       Current Outpatient Prescriptions on File Prior to Visit   Medication Sig Dispense Refill   • aspirin-acetaminophen-caffeine (EXCEDRIN MIGRAINE) 250-250-65 MG per tablet Take 1 tablet by mouth every 6 (six) hours as needed for headaches.     • Liraglutide (VICTOZA) 18 MG/3ML solution pen-injector injection Inject 1.8 mg under the skin Daily. 0.6mg subq daily x 14d, then 1.2 mg subq daily (disp with needles) 3 pen 5   • vitamin B-12 (CYANOCOBALAMIN) 1000 MCG tablet TAKE 1 TABLET BY MOUTH DAILY 30 tablet 2     Current Facility-Administered Medications on File Prior to Visit   Medication Dose Route Frequency Provider Last Rate Last Dose   • cyanocobalamin injection 1,000 mcg  1,000 mcg Intramuscular Q28 Days Toni Aviles DO   1,000 mcg at 10/16/17 1045       Objective   Vitals:    08/21/18 1337   BP: 102/68   Pulse: 102   Temp:  98.6 °F (37 °C)   SpO2: 97%   Weight: 96.2 kg (212 lb)       Physical Exam   Constitutional: She appears well-developed and well-nourished.   HENT:   Head: Normocephalic and atraumatic.   Neck: Neck supple. No JVD present. No thyromegaly present.   Cardiovascular: Normal rate, regular rhythm and normal heart sounds.  Exam reveals no gallop and no friction rub.    No murmur heard.  Pulmonary/Chest: Effort normal and breath sounds normal. No respiratory distress. She has no wheezes. She has no rales.   Abdominal: Soft. Bowel sounds are normal. She exhibits no distension. There is no tenderness. There is no rebound and no guarding.   Musculoskeletal: She exhibits no edema.   Neurological: She is alert.   Skin: Skin is warm and dry.   Psychiatric: She has a normal mood and affect. Her behavior is normal.   Nursing note and vitals reviewed.      Assessment/Plan   Sis was seen today for annual exam.    Diagnoses and all orders for this visit:    Wellness examination    Health care maintenance  -     Comprehensive Metabolic Panel  -     Lipid Panel    Class 2 obesity due to excess calories with serious comorbidity and body mass index (BMI) of 36.0 to 36.9 in adult  -     Comprehensive Metabolic Panel  -     Lipid Panel    counseled on diet and exercise  Check labs as due.         -Follow up: 3 months weight check

## 2018-08-22 LAB
ALBUMIN SERPL-MCNC: 4.2 G/DL (ref 3.5–5.5)
ALBUMIN/GLOB SERPL: 1.4 {RATIO} (ref 1.2–2.2)
ALP SERPL-CCNC: 91 IU/L (ref 39–117)
ALT SERPL-CCNC: 23 IU/L (ref 0–32)
AST SERPL-CCNC: 21 IU/L (ref 0–40)
BILIRUB SERPL-MCNC: 0.3 MG/DL (ref 0–1.2)
BUN SERPL-MCNC: 9 MG/DL (ref 6–20)
BUN/CREAT SERPL: 11 (ref 9–23)
CALCIUM SERPL-MCNC: 9.6 MG/DL (ref 8.7–10.2)
CHLORIDE SERPL-SCNC: 102 MMOL/L (ref 96–106)
CHOLEST SERPL-MCNC: 191 MG/DL (ref 100–199)
CO2 SERPL-SCNC: 22 MMOL/L (ref 20–29)
CREAT SERPL-MCNC: 0.84 MG/DL (ref 0.57–1)
GLOBULIN SER CALC-MCNC: 2.9 G/DL (ref 1.5–4.5)
GLUCOSE SERPL-MCNC: 74 MG/DL (ref 65–99)
HDLC SERPL-MCNC: 37 MG/DL
LDLC SERPL CALC-MCNC: 124 MG/DL (ref 0–99)
POTASSIUM SERPL-SCNC: 4.2 MMOL/L (ref 3.5–5.2)
PROT SERPL-MCNC: 7.1 G/DL (ref 6–8.5)
SODIUM SERPL-SCNC: 141 MMOL/L (ref 134–144)
TRIGL SERPL-MCNC: 150 MG/DL (ref 0–149)
VLDLC SERPL CALC-MCNC: 30 MG/DL (ref 5–40)

## 2018-08-22 NOTE — PROGRESS NOTES
Call and mail copy of results to patient.  Cholesterol mildly elevated, just continue work on diet.  Kidney and liver function normal  Blood sugar normal.

## 2018-08-23 ENCOUNTER — OFFICE VISIT (OUTPATIENT)
Dept: FAMILY MEDICINE CLINIC | Facility: CLINIC | Age: 33
End: 2018-08-23

## 2018-08-23 VITALS
DIASTOLIC BLOOD PRESSURE: 72 MMHG | SYSTOLIC BLOOD PRESSURE: 108 MMHG | BODY MASS INDEX: 37.39 KG/M2 | HEIGHT: 63 IN | OXYGEN SATURATION: 98 % | WEIGHT: 211 LBS | TEMPERATURE: 98.4 F | HEART RATE: 89 BPM

## 2018-08-23 DIAGNOSIS — Z01.419 ENCOUNTER FOR ANNUAL ROUTINE GYNECOLOGICAL EXAMINATION: Primary | ICD-10-CM

## 2018-08-23 DIAGNOSIS — Z12.39 SCREENING BREAST EXAMINATION: ICD-10-CM

## 2018-08-23 PROCEDURE — 99395 PREV VISIT EST AGE 18-39: CPT | Performed by: NURSE PRACTITIONER

## 2018-08-23 NOTE — PROGRESS NOTES
"Subjective   Sis Castro is a 33 y.o. female. Pap smear     History of Present Illness   Routine Gyn Exam: Patient here for routine exam.  Current Complaints: annual pap.  Personal Health Questionnaire Reviewed: not asked     Gynecologic History  No LMP recorded.  Contraception: none  Last Pap: 2017  Results: normal  Last Mammogram: NA  Results: NA    Obstetric History  : 3  Para: 0  AB: 3    The following portions of the patient's history were reviewed and updated as appropriate: allergies, current medications, past family history, past medical history, past social history, past surgical history and problem list.    Review of Systems   Constitutional: Negative.    Respiratory: Negative.    Cardiovascular: Negative.    Genitourinary:        Annual pap         Objective   Physical Exam   Constitutional: Vital signs are normal. She appears well-developed and well-nourished. She is cooperative. She is obese.  Cardiovascular: Normal rate and regular rhythm.    Pulmonary/Chest: Effort normal and breath sounds normal. Right breast exhibits no inverted nipple, no mass, no nipple discharge, no skin change and no tenderness. Left breast exhibits no inverted nipple, no mass, no nipple discharge, no skin change and no tenderness. Breasts are symmetrical.   Genitourinary: Vagina normal, uterus normal and cervix normal. Pelvic exam was performed with patient supine. There is no rash on the right labia. There is no rash on the left labia. Right adnexum displays no mass and no tenderness. Left adnexum displays no mass and no tenderness.   Neurological: She is alert.   Nursing note and vitals reviewed.    Vitals:    18 1509   BP: 108/72   Pulse: 89   Temp: 98.4 °F (36.9 °C)   TempSrc: Oral   SpO2: 98%   Weight: 95.7 kg (211 lb)   Height: 160 cm (62.99\")       Assessment/Plan   Sis was seen today for gynecologic exam.    Diagnoses and all orders for this visit:    Encounter for annual routine gynecological " examination  -     Pap IG, Ct-Ng TV Rfx HPV ASCU; Future    Screening breast examination    Will call with results.   Follow up annually for exams.    Monthly SBE:   Check your breasts near the same time each month. Either in the shower or lying down, use the pads of your fingers with firm pressure and move around the entire breast in a circular pattern or back and forth from top to bottom. Be sure to check the armpit area as well. You are looking for any lump, thickening or hardened knot. Notice any changes from the previous month. Next, in front of a mirror, visually inspect your breasts with your arms at your sides. Next, raise your arms high overhead. Look for any changes in the contour, any swelling, or dimpling of the skin, or changes in the nipples. Next, rest your palms on your hips and press firmly to flex your chest muscles. Left and right breasts will not exactly match--few women's breasts do, so look for any dimpling, puckering, or changes, particularly on one side. If you notice any changes get evaluated by your healthcare provider.

## 2018-08-24 ENCOUNTER — OFFICE VISIT (OUTPATIENT)
Dept: SLEEP MEDICINE | Facility: HOSPITAL | Age: 33
End: 2018-08-24

## 2018-08-24 VITALS
HEIGHT: 63 IN | DIASTOLIC BLOOD PRESSURE: 70 MMHG | SYSTOLIC BLOOD PRESSURE: 117 MMHG | WEIGHT: 212 LBS | OXYGEN SATURATION: 100 % | HEART RATE: 77 BPM | BODY MASS INDEX: 37.56 KG/M2

## 2018-08-24 DIAGNOSIS — G47.11 IDIOPATHIC HYPERSOMNOLENCE: Primary | ICD-10-CM

## 2018-08-24 DIAGNOSIS — E66.9 OBESITY (BMI 30-39.9): ICD-10-CM

## 2018-08-24 PROCEDURE — G0463 HOSPITAL OUTPT CLINIC VISIT: HCPCS

## 2018-08-24 NOTE — PROGRESS NOTES
[facility]  Telephone: [phone] / Fax: [phone] [place]  Telephone: [phone] / Fax: [phone] Petra Hyman    PCP: Toni Aviles DO    Reason for visit: hypersomnia f/u    Sis Castro is a 33 y.o.female  was last seen at [facility] sleep lab in April 2018 and is here today for hypersomnia f/u. She has been on Provigil 200mg qam for idiopathic hypersomnia. She is doing well on the provigil.  She denies SE. She takes Provigil at 5am. She continues to drink 2, 32 cups of caffeine during the day. She has reduced the amount of sugar she consumes and lost 16 pounds since March 2018 and is doing well.  Her sleep schedule is 7pm-3am, or 9pm-8am. Her ESS is 9. She denies EDS on the road at present.    SH- no tobacco, 0 alcohol, 0 energy drinks, 64 oz of caffeine.    ROS- negative.    Current Medications:    Current Outpatient Prescriptions:   •  aspirin-acetaminophen-caffeine (EXCEDRIN MIGRAINE) 250-250-65 MG per tablet, Take 1 tablet by mouth every 6 (six) hours as needed for headaches., Disp: , Rfl:   •  Liraglutide (VICTOZA) 18 MG/3ML solution pen-injector injection, Inject 1.8 mg under the skin Daily. 0.6mg subq daily x 14d, then 1.2 mg subq daily (disp with needles), Disp: 3 pen, Rfl: 5  •  modafinil (PROVIGIL) 100 MG tablet, Take 200 mg by mouth Daily., Disp: , Rfl:   •  vitamin B-12 (CYANOCOBALAMIN) 1000 MCG tablet, TAKE 1 TABLET BY MOUTH DAILY, Disp: 30 tablet, Rfl: 2    Current Facility-Administered Medications:   •  cyanocobalamin injection 1,000 mcg, 1,000 mcg, Intramuscular, Q28 Days, Toni Aviles DO, 1,000 mcg at 10/16/17 1045   also entered in Sleep Questionnaire    Patient  has a past medical history of Abnormal Pap smear of cervix; Cervical dysplasia; HPV in female; Ovarian cyst; Plantar warts; Trichomonas infection; and Wrist fracture, right.    I have reviewed the Past Medical History, Past Surgical History, Social History and Family History.             "ESS  9   Vital Signs /70 (BP Location: Left arm, Patient Position: Sitting)   Pulse 77   Ht 160 cm (63\")   Wt 96.2 kg (212 lb)   SpO2 100%   BMI 37.55 kg/m²  Body mass index is 37.55 kg/m².    General Alert and oriented. No acute distress noted   Pharynx/Throat Class IV Mallampati airway, large tongue, no evidence of redundant lateral pharyngeal tissue. No oral lesions. No thrush. Moist mucous membranes..   Head Normocephalic. Symmetrical. Atraumatic.    Nose No septal deviation. No drainage   Chest Wall Normal shape. Symmetric expansion with respiration. No tenderness.   Neck Trachea midline, no thyromegaly or adenopathy    Lungs Clear to auscultation bilaterally. No wheezes. No rhonchi. No rales. Respirations regular, even and unlabored.   Heart Regular rhythm and normal rate. Normal S1 and S2. No murmur   Abdomen Soft, non-tender and non-distended. Normal bowel sounds. No masses.   Extremities Moves all extremities well. No edema   Psychiatric Normal mood and affect.     Testing:  · [id number] reviewed    · Study-HST 10/31/17- AHI index 2.3.  This is not indicative of obstructive sleep apnea.  Adequate supine position sleep for 210 minutes with AHI only 3.1.  Snoring for 17% of sleep time.  Saturation below 89% for 0.1 minutes.    ·  PSG 2/2/18- Results: Overnight diagnostic polysomnogram study from 10:55 PM to 5:54 AM.  Sleep efficiency 71% with 4.97 hours total sleep time.  Sleep distribution shows reduced REM sleep at 8.1%.  AHI index is 1.4.  Supine index was 1.9.  In rem index was 5.0.  Arousal index 5.6.  Snoring noted for 63.28% of sleep time.    · MSLT 2/2/18-Results: 5 nap Multiple Sleep Latency Test.  Average sleep latency was 5.2 minutes which is short and could be indicative of narcolepsy.  No REM onsets was seen.      Impression:  1. Idiopathic hypersomnolence    2. Obesity (BMI 30-39.9)          Plan:  I informed patient that Modafinil may decrease the effectiveness of oral " contraceptive tablets and back up method of contraception is recommended to prevent pregnancy.She tolerated the medication well without SE. Caution during activities requiring prolonged concentration is advised. Weight loss will be beneficial to reduce the severity of hypersomnia. Ramo report was reviewed. I have refilled the Provigil today- #30 tabs with 2 refills. Ramo report will need to be reviewed in 3 months.       Follow up with Dr. Robertson in 6 months.    Thank you for allowing me to participate in your patient's care.      PABLITO Perez  [facility]  Phone: [phone]      Part of this note may be an electronic transcription/translation of spoken language to printed text using the Dragon Dictation System.

## 2018-08-28 LAB
C TRACH RRNA CVX QL NAA+PROBE: NEGATIVE
CONV .: NORMAL
CYTOLOGIST CVX/VAG CYTO: NORMAL
CYTOLOGY CVX/VAG DOC THIN PREP: NORMAL
DX ICD CODE: NORMAL
HIV 1 & 2 AB SER-IMP: NORMAL
Lab: NORMAL
N GONORRHOEA RRNA CVX QL NAA+PROBE: NEGATIVE
OTHER STN SPEC: NORMAL
PATH REPORT.FINAL DX SPEC: NORMAL
STAT OF ADQ CVX/VAG CYTO-IMP: NORMAL
T VAGINALIS RRNA SPEC QL NAA+PROBE: NEGATIVE

## 2018-09-06 ENCOUNTER — TELEPHONE (OUTPATIENT)
Dept: FAMILY MEDICINE CLINIC | Facility: CLINIC | Age: 33
End: 2018-09-06

## 2018-09-06 NOTE — TELEPHONE ENCOUNTER
----- Message from PABLITO Mcneill sent at 9/5/2018  8:28 AM EDT -----  Call the patient on the lab results. Pap was negative. Repeat as part of annual women's health exam in one year.

## 2018-11-20 ENCOUNTER — OFFICE VISIT (OUTPATIENT)
Dept: FAMILY MEDICINE CLINIC | Facility: CLINIC | Age: 33
End: 2018-11-20

## 2018-11-20 VITALS
HEART RATE: 88 BPM | BODY MASS INDEX: 37.55 KG/M2 | TEMPERATURE: 98.6 F | SYSTOLIC BLOOD PRESSURE: 110 MMHG | WEIGHT: 212 LBS | OXYGEN SATURATION: 97 % | DIASTOLIC BLOOD PRESSURE: 60 MMHG

## 2018-11-20 DIAGNOSIS — E28.2 PCOS (POLYCYSTIC OVARIAN SYNDROME): Primary | ICD-10-CM

## 2018-11-20 DIAGNOSIS — R73.03 PREDIABETES: ICD-10-CM

## 2018-11-20 DIAGNOSIS — F17.200 SMOKING: ICD-10-CM

## 2018-11-20 PROCEDURE — 99406 BEHAV CHNG SMOKING 3-10 MIN: CPT | Performed by: FAMILY MEDICINE

## 2018-11-20 PROCEDURE — 99213 OFFICE O/P EST LOW 20 MIN: CPT | Performed by: FAMILY MEDICINE

## 2018-11-20 RX ORDER — VARENICLINE TARTRATE 1 MG/1
1 TABLET, FILM COATED ORAL 2 TIMES DAILY
Qty: 60 TABLET | Refills: 5 | Status: SHIPPED | OUTPATIENT
Start: 2018-11-20

## 2018-11-20 NOTE — PROGRESS NOTES
Subjective   Sis Castro is a 33 y.o. female. Presents today for   Chief Complaint   Patient presents with   • Follow-up     pcos and prediabetes.  needs chantix rx       History of Present Illness  Patient here for f/u;  Patient with hx of pcos, obesity and prediabetes on victoza to try and lose weight and reduce chance of progressoin to diabetes. Menstrual cycle irregular and infrequent.  Doing ok overall.  She did go back to smoking this past April.  Quit for 1 year after chantix and would liek again to try and quit.  Reports doing 41767 steps a day.  Review of Systems   Respiratory: Negative for shortness of breath.    Cardiovascular: Negative for chest pain.   Gastrointestinal: Negative for abdominal pain, nausea and vomiting.       Patient Active Problem List   Diagnosis   • PCOS (polycystic ovarian syndrome)   • Fatigue   • B12 deficiency       Social History     Socioeconomic History   • Marital status: Single     Spouse name: Not on file   • Number of children: Not on file   • Years of education: Not on file   • Highest education level: Not on file   Tobacco Use   • Smoking status: Current Some Day Smoker     Packs/day: 0.50     Start date: 4/26/2003   • Smokeless tobacco: Never Used   Substance and Sexual Activity   • Alcohol use: Yes     Comment: OCCASSIONAL   • Drug use: No   • Sexual activity: Yes     Partners: Male       Allergies   Allergen Reactions   • Percocet [Oxycodone-Acetaminophen] Delirium       Current Outpatient Medications on File Prior to Visit   Medication Sig Dispense Refill   • aspirin-acetaminophen-caffeine (EXCEDRIN MIGRAINE) 250-250-65 MG per tablet Take 1 tablet by mouth every 6 (six) hours as needed for headaches.     • Liraglutide (VICTOZA) 18 MG/3ML solution pen-injector injection Inject 1.8 mg under the skin Daily. 0.6mg subq daily x 14d, then 1.2 mg subq daily (disp with needles) 3 pen 5   • modafinil (PROVIGIL) 100 MG tablet Take 200 mg by mouth Daily.     • vitamin B-12  (CYANOCOBALAMIN) 1000 MCG tablet TAKE 1 TABLET BY MOUTH DAILY 30 tablet 2     Current Facility-Administered Medications on File Prior to Visit   Medication Dose Route Frequency Provider Last Rate Last Dose   • cyanocobalamin injection 1,000 mcg  1,000 mcg Intramuscular Q28 Days Stefan Toni DO KEON   1,000 mcg at 10/16/17 1045       Objective   Vitals:    11/20/18 1450   BP: 110/60   Pulse: 88   Temp: 98.6 °F (37 °C)   SpO2: 97%   Weight: 96.2 kg (212 lb)       Physical Exam   Constitutional: She appears well-developed and well-nourished.   HENT:   Head: Normocephalic and atraumatic.   Neck: Neck supple. No JVD present. No thyromegaly present.   Cardiovascular: Normal rate, regular rhythm and normal heart sounds. Exam reveals no gallop and no friction rub.   No murmur heard.  Pulmonary/Chest: Effort normal and breath sounds normal. No respiratory distress. She has no wheezes. She has no rales.   Abdominal: Soft. Bowel sounds are normal. She exhibits no distension. There is no tenderness. There is no rebound and no guarding.   Musculoskeletal: She exhibits no edema.   Neurological: She is alert.   Skin: Skin is warm and dry.   Psychiatric: She has a normal mood and affect. Her behavior is normal.   Nursing note and vitals reviewed.      Assessment/Plan   Sis was seen today for follow-up.    Diagnoses and all orders for this visit:    PCOS (polycystic ovarian syndrome)  -     Comprehensive Metabolic Panel  -     Lipid Panel  -     Hemoglobin A1c  -     Insulin, Total  -     Liraglutide (VICTOZA) 18 MG/3ML solution pen-injector injection; Inject 1.8 mg under the skin into the appropriate area as directed Daily.    Prediabetes  -     Comprehensive Metabolic Panel  -     Lipid Panel  -     Hemoglobin A1c  -     Insulin, Total  -     Liraglutide (VICTOZA) 18 MG/3ML solution pen-injector injection; Inject 1.8 mg under the skin into the appropriate area as directed Daily.    Smoking  -     varenicline (CHANTIX  STARTING MONTH ROXANNA) 0.5 MG X 11 & 1 MG X 42 tablet; Take 0.5 mg one daily on days 1-3 and and 0.5 mg twice daily on days 4-7.Then 1 mg twice daily for a total of 12 weeks.  -     varenicline (CHANTIX CONTINUING MONTH ROXANNA) 1 MG tablet; Take 1 tablet by mouth 2 (Two) Times a Day.    -I advised the patient of the risks in continuing to use tobacco, and I provided this patient with smoking cessation Rx.  I also discussed how to quit smoking and the patient has expressed the willingness to quit.    -During this visit, I spent 3-10 mintues counseling the patient regarding smoking cessation.   Continue work on diet, exercise.         -Follow up: 6 months and prn

## 2018-11-21 LAB
ALBUMIN SERPL-MCNC: 4.2 G/DL (ref 3.5–5.5)
ALBUMIN/GLOB SERPL: 1.6 {RATIO} (ref 1.2–2.2)
ALP SERPL-CCNC: 94 IU/L (ref 39–117)
ALT SERPL-CCNC: 12 IU/L (ref 0–32)
AST SERPL-CCNC: 15 IU/L (ref 0–40)
BILIRUB SERPL-MCNC: 0.2 MG/DL (ref 0–1.2)
BUN SERPL-MCNC: 9 MG/DL (ref 6–20)
BUN/CREAT SERPL: 12 (ref 9–23)
CALCIUM SERPL-MCNC: 9.5 MG/DL (ref 8.7–10.2)
CHLORIDE SERPL-SCNC: 103 MMOL/L (ref 96–106)
CHOLEST SERPL-MCNC: 212 MG/DL (ref 100–199)
CO2 SERPL-SCNC: 21 MMOL/L (ref 20–29)
CREAT SERPL-MCNC: 0.76 MG/DL (ref 0.57–1)
GLOBULIN SER CALC-MCNC: 2.7 G/DL (ref 1.5–4.5)
GLUCOSE SERPL-MCNC: 86 MG/DL (ref 65–99)
HBA1C MFR BLD: 5.1 % (ref 4.8–5.6)
HDLC SERPL-MCNC: 37 MG/DL
INSULIN SERPL-ACNC: 42.6 UIU/ML (ref 2.6–24.9)
LDLC SERPL CALC-MCNC: 142 MG/DL (ref 0–99)
POTASSIUM SERPL-SCNC: 4.7 MMOL/L (ref 3.5–5.2)
PROT SERPL-MCNC: 6.9 G/DL (ref 6–8.5)
SODIUM SERPL-SCNC: 141 MMOL/L (ref 134–144)
TRIGL SERPL-MCNC: 167 MG/DL (ref 0–149)
VLDLC SERPL CALC-MCNC: 33 MG/DL (ref 5–40)

## 2019-01-14 RX ORDER — MODAFINIL 100 MG/1
200 TABLET ORAL DAILY
Qty: 60 TABLET | Refills: 0 | Status: SHIPPED | OUTPATIENT
Start: 2019-01-14 | End: 2019-02-15 | Stop reason: SDUPTHER

## 2019-01-31 ENCOUNTER — DOCUMENTATION (OUTPATIENT)
Dept: SLEEP MEDICINE | Facility: HOSPITAL | Age: 34
End: 2019-01-31

## 2019-02-15 RX ORDER — MODAFINIL 100 MG/1
200 TABLET ORAL DAILY
Qty: 60 TABLET | Refills: 0 | Status: SHIPPED | OUTPATIENT
Start: 2019-02-15 | End: 2019-03-01 | Stop reason: SDUPTHER

## 2019-03-01 ENCOUNTER — OFFICE VISIT (OUTPATIENT)
Dept: SLEEP MEDICINE | Facility: HOSPITAL | Age: 34
End: 2019-03-01
Attending: INTERNAL MEDICINE

## 2019-03-01 VITALS
BODY MASS INDEX: 37.56 KG/M2 | WEIGHT: 212 LBS | OXYGEN SATURATION: 99 % | SYSTOLIC BLOOD PRESSURE: 113 MMHG | HEART RATE: 86 BPM | DIASTOLIC BLOOD PRESSURE: 63 MMHG | HEIGHT: 63 IN

## 2019-03-01 DIAGNOSIS — G47.419 NARCOLEPSY WITHOUT CATAPLEXY: Primary | ICD-10-CM

## 2019-03-01 DIAGNOSIS — E66.9 OBESITY (BMI 30-39.9): ICD-10-CM

## 2019-03-01 PROCEDURE — G0463 HOSPITAL OUTPT CLINIC VISIT: HCPCS

## 2019-03-01 RX ORDER — MODAFINIL 200 MG/1
400 TABLET ORAL DAILY
Qty: 60 TABLET | Refills: 2 | Status: SHIPPED | OUTPATIENT
Start: 2019-03-01 | End: 2019-05-30

## 2019-03-01 NOTE — PROGRESS NOTES
[facility] SLEEP MEDICINE  [address] [facility]  [address] [facility] [address]  [phone]    PCP: Toni Aviles DO    Reason for visit:  Sleep disorders: Idiopathic hypersomnia possible Narcolepsy    Sis is a 33 y.o.female who was seen in the Sleep Disorders Center today. About 2 months ago her daytime sleepiness has worsened despite taking same dose of meds. She takes Provigil 200 mg qam. Sleeps from 5-7 pm to 3-5 am. By around 12 noon she starts to get sleepy. Delon sleepy if she is not at work. She is taking Chantix to help her quit smoking.  [place] Sleepiness Scale is 13. Caffeine 64 oz per day. Alcohol 0 per week.    Sis  reports that she has been smoking.  She started smoking about 15 years ago. She has been smoking about 0.50 packs per day. she has never used smokeless tobacco.    Pertinent Positive Review of Systems of denies  Rest of Review of Systems was negative as recorded in Sleep Questionnaire.    Patient  has a past medical history of Abnormal Pap smear of cervix, Cervical dysplasia, HPV in female, Ovarian cyst, Plantar warts, Trichomonas infection, and Wrist fracture, right.     Current Medications:    Current Outpatient Medications:   •  aspirin-acetaminophen-caffeine (EXCEDRIN MIGRAINE) 250-250-65 MG per tablet, Take 1 tablet by mouth every 6 (six) hours as needed for headaches., Disp: , Rfl:   •  Liraglutide (VICTOZA) 18 MG/3ML solution pen-injector injection, Inject 1.8 mg under the skin Daily. 0.6mg subq daily x 14d, then 1.2 mg subq daily (disp with needles), Disp: 3 pen, Rfl: 5  •  Liraglutide (VICTOZA) 18 MG/3ML solution pen-injector injection, Inject 1.8 mg under the skin into the appropriate area as directed Daily., Disp: 3 pen, Rfl: 12  •  modafinil (PROVIGIL) 200 MG tablet, Take 2 tablets by mouth Daily for 90 days., Disp: 60 tablet, Rfl: 2  •  varenicline (CHANTIX CONTINUING MONTH ROXANNA) 1 MG tablet, Take 1 tablet by mouth 2 (Two) Times a Day., Disp: 60 tablet,  "Rfl: 5  •  varenicline (CHANTIX STARTING MONTH PAK) 0.5 MG X 11 & 1 MG X 42 tablet, Take 0.5 mg one daily on days 1-3 and and 0.5 mg twice daily on days 4-7.Then 1 mg twice daily for a total of 12 weeks., Disp: 53 tablet, Rfl: 0  •  vitamin B-12 (CYANOCOBALAMIN) 1000 MCG tablet, TAKE 1 TABLET BY MOUTH DAILY, Disp: 30 tablet, Rfl: 2    Current Facility-Administered Medications:   •  cyanocobalamin injection 1,000 mcg, 1,000 mcg, Intramuscular, Q28 Days, Toni Aviles DO, 1,000 mcg at 10/16/17 1045   also entered in Sleep Questionnaire         Vital Signs: /63   Pulse 86   Ht 160 cm (63\")   Wt 96.2 kg (212 lb)   SpO2 99%   BMI 37.55 kg/m²     Body mass index is 37.55 kg/m².       Tongue: normal      Dentition: good       Pharynx: Posterior pharyngeal pillars are narrow   Mallampatti: IV (only hard palate visible)        General: Alert. Cooperative. Well developed. No acute distress.             Head:  Normocephalic. Symmetrical. Atraumatic.              Nose: No septal deviation. No drainage.          Throat: No oral lesions. No thrush. Moist mucous membranes.    Chest Wall:  Normal shape. Symmetric expansion with respiration. No tenderness.             Neck:  Trachea midline.           Lungs:  Clear to auscultation bilaterally. No wheezes. No rhonchi. No rales. Respirations regular, even and unlabored.            Heart:  Regular rhythm and normal rate. Normal S1 and S2. No murmur.     Abdomen:  Soft, non-tender and non-distended. Normal bowel sounds. No masses.  Extremities:  Moves all extremities well. No edema.    Psychiatric: Normal mood and affect.    Study:  10/31/17 HST  AHI index 2.3.  This is not indicative of obstructive sleep apnea.  Adequate supine position sleep for 210 minutes with AHI only 3.1.  Snoring for 17% of sleep time.  Saturation below 89% for 0.1 minutes.  2/2/18 Diagnostic  Overnight diagnostic polysomnogram study from 10:55 PM to 5:54 AM.  Sleep efficiency 71% with 4.97 hours " total sleep time.  Sleep distribution shows reduced REM sleep at 8.1%.  AHI index is 1.4.  Supine index was 1.9.  In rem index was 5.0.  Arousal index 5.6.  Snoring noted for 63.28% of sleep time.  2/3/18 MSLT  5 nap Multiple Sleep Latency Test.  Average sleep latency was 5.2 minutes which is short and could be indicative of narcolepsy.  No REM onsets was seen    Testing:  Rashard Ralph [id number]     Impression:  1. Narcolepsy without cataplexy    2. Obesity (BMI 30-39.9)        Plan:  Sis probably has narcolepsy without cataplexy.  Given her young age this is the likely diagnosis even though REM onsets were not seen on the Multiple Sleep Latency Test.  She will continue on Provigil.  Given her excessive sleepiness and lack of control of symptoms, she will use 200 mg Provigil twice a day.  Instructions provided.  She is not having any side effects.  She was cautioned about use of OCPs and alternative methods of contraception.     Patient will follow up in this clinic in 6 months  PABLITO    Thank you for allowing me to participate in your patient's care.    Mart Robertson MD    Part of this note may be an electronic transcription/translation of spoken language to printed text using the Dragon Dictation System.

## 2019-03-18 ENCOUNTER — OFFICE VISIT (OUTPATIENT)
Dept: FAMILY MEDICINE CLINIC | Facility: CLINIC | Age: 34
End: 2019-03-18

## 2019-03-18 VITALS
SYSTOLIC BLOOD PRESSURE: 90 MMHG | BODY MASS INDEX: 38.62 KG/M2 | WEIGHT: 218 LBS | OXYGEN SATURATION: 96 % | DIASTOLIC BLOOD PRESSURE: 66 MMHG | HEIGHT: 63 IN | HEART RATE: 87 BPM

## 2019-03-18 DIAGNOSIS — E66.01 CLASS 2 SEVERE OBESITY DUE TO EXCESS CALORIES WITH SERIOUS COMORBIDITY AND BODY MASS INDEX (BMI) OF 38.0 TO 38.9 IN ADULT (HCC): ICD-10-CM

## 2019-03-18 DIAGNOSIS — E28.2 PCOS (POLYCYSTIC OVARIAN SYNDROME): Primary | ICD-10-CM

## 2019-03-18 DIAGNOSIS — E78.5 HYPERLIPIDEMIA LDL GOAL <100: ICD-10-CM

## 2019-03-18 PROCEDURE — 99214 OFFICE O/P EST MOD 30 MIN: CPT | Performed by: NURSE PRACTITIONER

## 2019-03-18 RX ORDER — PEN NEEDLE, DIABETIC 29 G X1/2"
NEEDLE, DISPOSABLE MISCELLANEOUS
Refills: 5 | COMMUNITY
Start: 2019-01-15

## 2019-03-18 NOTE — PROGRESS NOTES
"Subjective   Sis Castro is a 33 y.o. female who presents to us for her annual exam (no pap).     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 Moved follow up closer to accommodate travel schedule. Unsure how oliver has been working for PCOS, thinks has had an occasional low blood sugar. Did buy a glucose monitor last week in February. Was not eating different at the time. Readings were 92, 72--was very shaky at the time. No change to work schedule. Issue resolved after about 1 week.   The following portions of the patient's history were reviewed and updated as appropriate: allergies, current medications, past family history, past medical history, past social history, past surgical history and problem list.    Review of Systems   Constitutional: Positive for unexpected weight gain. Negative for activity change, appetite change, chills, fatigue, fever and unexpected weight loss.   Respiratory: Negative.  Negative for shortness of breath.    Cardiovascular: Negative.  Negative for chest pain, palpitations and leg swelling.   Psychiatric/Behavioral: Negative.      BP 90/66 (BP Location: Right arm, Patient Position: Sitting, Cuff Size: Large Adult)   Pulse 87   Ht 160 cm (63\")   Wt 98.9 kg (218 lb)   LMP 02/19/2019 (Exact Date)   SpO2 96%   BMI 38.62 kg/m²     Objective   Physical Exam   Constitutional: She appears well-developed and well-nourished.   Neck: Normal range of motion. Neck supple. No thyromegaly present.   Cardiovascular: Normal rate, regular rhythm and normal heart sounds.   Pulmonary/Chest: Effort normal and breath sounds normal.   Lymphadenopathy:     She has no cervical adenopathy.   Skin: Skin is warm and dry.   Facial hair   Psychiatric: She has a normal mood and affect. Her behavior is normal. Judgment and thought content normal.   Nursing note and vitals reviewed.    Assessment/Plan   Problems Addressed this Visit        " Digestive    Class 2 severe obesity due to excess calories with serious comorbidity and body mass index (BMI) of 38.0 to 38.9 in adult (CMS/[facility]) - Primary    Relevant Orders    TSH       Endocrine    PCOS (polycystic ovarian syndrome)    Relevant Orders    Comprehensive Metabolic Panel    Insulin, Random    Hemoglobin A1c      Other Visit Diagnoses     Hyperlipidemia LDL goal <100        Relevant Orders    Lipid Panel        Did quit smoking, still on chantix, 1-2 weeks ago, continued work at smoking cessation, continue chantix x 3 months  Obesity--plans to start exercising by hiking in [place], check TSH  PCOS--check labs, continue victoza--Keep High quality protein snacks on hand with life changes.   HLD--check labs  FU 6 months.

## 2019-03-21 LAB
ALBUMIN SERPL-MCNC: 4 G/DL (ref 3.5–5.5)
ALBUMIN/GLOB SERPL: 1.4 {RATIO} (ref 1.2–2.2)
ALP SERPL-CCNC: 91 IU/L (ref 39–117)
ALT SERPL-CCNC: 11 IU/L (ref 0–32)
AST SERPL-CCNC: 10 IU/L (ref 0–40)
BILIRUB SERPL-MCNC: 0.3 MG/DL (ref 0–1.2)
BUN SERPL-MCNC: 12 MG/DL (ref 6–20)
BUN/CREAT SERPL: 13 (ref 9–23)
CALCIUM SERPL-MCNC: 9.2 MG/DL (ref 8.7–10.2)
CHLORIDE SERPL-SCNC: 105 MMOL/L (ref 96–106)
CHOLEST SERPL-MCNC: 176 MG/DL (ref 100–199)
CO2 SERPL-SCNC: 22 MMOL/L (ref 20–29)
CREAT SERPL-MCNC: 0.92 MG/DL (ref 0.57–1)
GLOBULIN SER CALC-MCNC: 2.8 G/DL (ref 1.5–4.5)
GLUCOSE SERPL-MCNC: 94 MG/DL (ref 65–99)
HBA1C MFR BLD: 5.3 % (ref 4.8–5.6)
HDLC SERPL-MCNC: 40 MG/DL
INSULIN SERPL-ACNC: 25 UIU/ML
LDLC SERPL CALC-MCNC: 111 MG/DL (ref 0–99)
POTASSIUM SERPL-SCNC: 4.5 MMOL/L (ref 3.5–5.2)
PROT SERPL-MCNC: 6.8 G/DL (ref 6–8.5)
SODIUM SERPL-SCNC: 143 MMOL/L (ref 134–144)
TRIGL SERPL-MCNC: 127 MG/DL (ref 0–149)
TSH SERPL DL<=0.005 MIU/L-ACNC: 1.8 UIU/ML (ref 0.45–4.5)
VLDLC SERPL CALC-MCNC: 25 MG/DL (ref 5–40)